# Patient Record
Sex: FEMALE | Race: WHITE | NOT HISPANIC OR LATINO | Employment: FULL TIME | URBAN - METROPOLITAN AREA
[De-identification: names, ages, dates, MRNs, and addresses within clinical notes are randomized per-mention and may not be internally consistent; named-entity substitution may affect disease eponyms.]

---

## 2017-10-06 DIAGNOSIS — Z12.11 COLON CANCER SCREENING: ICD-10-CM

## 2017-10-26 DIAGNOSIS — Z00.00 WELL ADULT EXAM: Primary | ICD-10-CM

## 2017-10-26 DIAGNOSIS — E78.5 HYPERLIPIDEMIA, UNSPECIFIED HYPERLIPIDEMIA TYPE: ICD-10-CM

## 2017-10-26 DIAGNOSIS — I10 ESSENTIAL HYPERTENSION: ICD-10-CM

## 2017-10-26 DIAGNOSIS — E55.9 VITAMIN D DEFICIENCY DISEASE: ICD-10-CM

## 2017-10-26 DIAGNOSIS — E66.9 OBESITY, UNSPECIFIED CLASSIFICATION, UNSPECIFIED OBESITY TYPE, UNSPECIFIED WHETHER SERIOUS COMORBIDITY PRESENT: ICD-10-CM

## 2017-10-26 RX ORDER — SIMVASTATIN 20 MG/1
TABLET, FILM COATED ORAL
Qty: 30 TABLET | Refills: 0 | Status: SHIPPED | OUTPATIENT
Start: 2017-10-26 | End: 2017-11-17 | Stop reason: SDUPTHER

## 2017-10-26 RX ORDER — SERTRALINE HYDROCHLORIDE 100 MG/1
TABLET, FILM COATED ORAL
Qty: 30 TABLET | Refills: 0 | Status: SHIPPED | OUTPATIENT
Start: 2017-10-26 | End: 2017-11-17 | Stop reason: SDUPTHER

## 2017-10-26 RX ORDER — LISINOPRIL AND HYDROCHLOROTHIAZIDE 10; 12.5 MG/1; MG/1
TABLET ORAL
Qty: 30 TABLET | Refills: 0 | Status: SHIPPED | OUTPATIENT
Start: 2017-10-26 | End: 2017-11-17 | Stop reason: SDUPTHER

## 2017-10-26 NOTE — TELEPHONE ENCOUNTER
Labs linked to scheduled appt  Mammogram referral already in    Sent referral for mammogram to coordinator.    Spoke with pt & she verbalized understanding

## 2017-10-26 NOTE — TELEPHONE ENCOUNTER
----- Message from Mona Burks sent at 10/26/2017 11:45 AM CDT -----  Contact: Self [phone]  Doctor appointment and lab have been scheduled.  Please link lab orders to the lab appointment.  Date of doctor appointment: 11/17   Physical or EP:  physical  Date of lab appointment: 11/04   Comments: Pt requesting orders to schedule mammo

## 2017-10-26 NOTE — TELEPHONE ENCOUNTER
The patient was due for a physical exam last month.  Labs and urine have been ordered.  Please schedule an inform the patient.  Thank you.

## 2017-11-14 ENCOUNTER — HOSPITAL ENCOUNTER (OUTPATIENT)
Dept: RADIOLOGY | Facility: HOSPITAL | Age: 57
Discharge: HOME | End: 2017-11-14
Attending: FAMILY MEDICINE
Payer: COMMERCIAL

## 2017-11-14 VITALS — HEIGHT: 62 IN | BODY MASS INDEX: 39.56 KG/M2 | WEIGHT: 215 LBS

## 2017-11-14 DIAGNOSIS — Z12.31 VISIT FOR SCREENING MAMMOGRAM: ICD-10-CM

## 2017-11-14 PROCEDURE — 77067 SCR MAMMO BI INCL CAD: CPT | Mod: 26,,, | Performed by: RADIOLOGY

## 2017-11-14 PROCEDURE — 77067 SCR MAMMO BI INCL CAD: CPT | Mod: TC

## 2017-11-14 PROCEDURE — 77063 BREAST TOMOSYNTHESIS BI: CPT | Mod: 26,,, | Performed by: RADIOLOGY

## 2017-11-17 ENCOUNTER — OFFICE VISIT (OUTPATIENT)
Dept: INTERNAL MEDICINE | Facility: CLINIC | Age: 57
End: 2017-11-17
Payer: COMMERCIAL

## 2017-11-17 ENCOUNTER — DOCUMENTATION ONLY (OUTPATIENT)
Dept: INTERNAL MEDICINE | Facility: CLINIC | Age: 57
End: 2017-11-17

## 2017-11-17 VITALS
HEIGHT: 62 IN | BODY MASS INDEX: 39.6 KG/M2 | SYSTOLIC BLOOD PRESSURE: 120 MMHG | TEMPERATURE: 99 F | RESPIRATION RATE: 18 BRPM | OXYGEN SATURATION: 95 % | WEIGHT: 215.19 LBS | HEART RATE: 83 BPM | DIASTOLIC BLOOD PRESSURE: 70 MMHG

## 2017-11-17 DIAGNOSIS — E66.9 OBESITY, UNSPECIFIED CLASSIFICATION, UNSPECIFIED OBESITY TYPE, UNSPECIFIED WHETHER SERIOUS COMORBIDITY PRESENT: ICD-10-CM

## 2017-11-17 DIAGNOSIS — Z12.11 COLON CANCER SCREENING: ICD-10-CM

## 2017-11-17 DIAGNOSIS — F41.9 ANXIETY: ICD-10-CM

## 2017-11-17 DIAGNOSIS — Z23 NEED FOR PROPHYLACTIC VACCINATION AND INOCULATION AGAINST INFLUENZA: ICD-10-CM

## 2017-11-17 DIAGNOSIS — L40.9 PSORIASIS: ICD-10-CM

## 2017-11-17 DIAGNOSIS — E78.5 HYPERLIPIDEMIA, UNSPECIFIED HYPERLIPIDEMIA TYPE: ICD-10-CM

## 2017-11-17 DIAGNOSIS — I10 ESSENTIAL HYPERTENSION: ICD-10-CM

## 2017-11-17 DIAGNOSIS — Z00.00 WELL ADULT EXAM: Primary | ICD-10-CM

## 2017-11-17 DIAGNOSIS — E55.9 VITAMIN D DEFICIENCY DISEASE: ICD-10-CM

## 2017-11-17 PROCEDURE — 90686 IIV4 VACC NO PRSV 0.5 ML IM: CPT | Mod: S$GLB,,, | Performed by: FAMILY MEDICINE

## 2017-11-17 PROCEDURE — 90471 IMMUNIZATION ADMIN: CPT | Mod: S$GLB,,, | Performed by: FAMILY MEDICINE

## 2017-11-17 PROCEDURE — 99396 PREV VISIT EST AGE 40-64: CPT | Mod: 25,S$GLB,, | Performed by: FAMILY MEDICINE

## 2017-11-17 PROCEDURE — 99999 PR PBB SHADOW E&M-EST. PATIENT-LVL IV: CPT | Mod: PBBFAC,,, | Performed by: FAMILY MEDICINE

## 2017-11-17 RX ORDER — SIMVASTATIN 20 MG/1
20 TABLET, FILM COATED ORAL NIGHTLY
Qty: 30 TABLET | Refills: 11 | Status: SHIPPED | OUTPATIENT
Start: 2017-11-17 | End: 2018-11-12 | Stop reason: SDUPTHER

## 2017-11-17 RX ORDER — SERTRALINE HYDROCHLORIDE 100 MG/1
100 TABLET, FILM COATED ORAL DAILY
Qty: 30 TABLET | Refills: 11 | Status: SHIPPED | OUTPATIENT
Start: 2017-11-17 | End: 2018-11-12 | Stop reason: SDUPTHER

## 2017-11-17 RX ORDER — LISINOPRIL AND HYDROCHLOROTHIAZIDE 10; 12.5 MG/1; MG/1
1 TABLET ORAL DAILY
Qty: 30 TABLET | Refills: 11 | Status: SHIPPED | OUTPATIENT
Start: 2017-11-17 | End: 2018-11-12 | Stop reason: SDUPTHER

## 2017-11-17 NOTE — PROGRESS NOTES
Subjective:       Patient ID: Claudia Álvarez is a 57 y.o. female.    Chief Complaint: Annual Exam    HPI 57-year-old white female presents to clinic today for annual physical exam.  She continues to have well-controlled hypertension on lisinopril-hydrochlorothiazide 10/12.5 mg daily.  Anxiety remains stable on Zoloft 100 mg daily.  Hyperlipidemia is well-controlled on simvastatin 20 mg daily.  She continues to be followed by dermatology for continued treatment of psoriasis.  She has a past surgical history of cholecystectomy, bilateral tubal ligation, and right knee arthroscopic surgery.  She has a family history of early coronary artery disease in her father who had a heart attack at the age of 65.  He also suffered from renal failure.  Flu vaccine has been discussed and will be given.  Patient declines Pap smear.  Patient declines colonoscopy and is willing to do colon cancer screening via FIT test.   Review of Systems   Constitutional: Negative for appetite change, chills, fatigue and fever.   HENT: Negative for congestion, ear pain, hearing loss, postnasal drip, rhinorrhea, sinus pressure, sore throat and tinnitus.    Eyes: Negative for redness, itching and visual disturbance.   Respiratory: Negative for cough, chest tightness and shortness of breath.    Cardiovascular: Negative for chest pain and palpitations.   Gastrointestinal: Negative for abdominal pain, constipation, diarrhea, nausea and vomiting.   Genitourinary: Negative for decreased urine volume, difficulty urinating, dysuria, frequency, hematuria and urgency.   Musculoskeletal: Negative for back pain, myalgias, neck pain and neck stiffness.   Skin: Negative for rash.   Neurological: Negative for dizziness, light-headedness and headaches.   Psychiatric/Behavioral: Negative.        Objective:      Physical Exam   Constitutional: She is oriented to person, place, and time. She appears well-developed and well-nourished. No distress.   HENT:   Head:  Normocephalic and atraumatic.   Right Ear: External ear normal.   Left Ear: External ear normal.   Nose: Nose normal.   Mouth/Throat: Oropharynx is clear and moist. No oropharyngeal exudate.   Eyes: Conjunctivae and EOM are normal. Pupils are equal, round, and reactive to light. Right eye exhibits no discharge. Left eye exhibits no discharge. No scleral icterus.   Neck: Normal range of motion. Neck supple. No JVD present. No tracheal deviation present. No thyromegaly present.   Cardiovascular: Normal rate, regular rhythm, normal heart sounds and intact distal pulses.  Exam reveals no gallop and no friction rub.    No murmur heard.  Pulmonary/Chest: Effort normal and breath sounds normal. No stridor. No respiratory distress. She has no wheezes. She has no rales.   Abdominal: Soft. Bowel sounds are normal. She exhibits no distension and no mass. There is no tenderness. There is no rebound and no guarding.   Musculoskeletal: Normal range of motion. She exhibits no edema or tenderness.   Lymphadenopathy:     She has no cervical adenopathy.   Neurological: She is alert and oriented to person, place, and time.   Skin: Skin is warm and dry. No rash noted. She is not diaphoretic. No erythema. No pallor.   Psychiatric: She has a normal mood and affect. Her behavior is normal. Judgment and thought content normal.   Nursing note and vitals reviewed.      Assessment:       1. Well adult exam    2. Essential hypertension    3. Hyperlipidemia, unspecified hyperlipidemia type    4. Anxiety    5. Psoriasis    6. Vitamin D deficiency disease    7. Obesity, unspecified classification, unspecified obesity type, unspecified whether serious comorbidity present    8. Need for prophylactic vaccination and inoculation against influenza    9. Colon cancer screening        Plan:      1.  Labs have been reviewed and are overall within normal limits.  2.  Continue lisinopril have hydrochlorothiazide 10/12.5 mg daily.  Hypertension is well  controlled.  3.  Continue simvastatin 20 mg daily.  Hyperlipidemia is well controlled.  4.  Continue Zoloft 100 mg daily.  Anxiety is stable.  5.  Continue follow-up with dermatology as scheduled.  Psoriasis is stable.  6.  Continue vitamin D 2000 units daily.  Vitamin D level is stable.  7.  Encourage diet and exercise.  8.  Flu vaccine given.  9.  FIT test.   10.  Return to clinic as needed or in one year for annual exam.

## 2017-11-24 ENCOUNTER — OFFICE VISIT (OUTPATIENT)
Dept: INTERNAL MEDICINE | Facility: CLINIC | Age: 57
End: 2017-11-24
Payer: COMMERCIAL

## 2017-11-24 VITALS
HEART RATE: 65 BPM | WEIGHT: 213.19 LBS | HEIGHT: 62 IN | SYSTOLIC BLOOD PRESSURE: 120 MMHG | OXYGEN SATURATION: 97 % | BODY MASS INDEX: 39.23 KG/M2 | DIASTOLIC BLOOD PRESSURE: 72 MMHG

## 2017-11-24 DIAGNOSIS — H10.9 CONJUNCTIVITIS OF LEFT EYE, UNSPECIFIED CONJUNCTIVITIS TYPE: ICD-10-CM

## 2017-11-24 DIAGNOSIS — H57.89 EYE SWELLING, LEFT: Primary | ICD-10-CM

## 2017-11-24 PROCEDURE — 99213 OFFICE O/P EST LOW 20 MIN: CPT | Mod: S$GLB,,, | Performed by: FAMILY MEDICINE

## 2017-11-24 PROCEDURE — 99999 PR PBB SHADOW E&M-EST. PATIENT-LVL III: CPT | Mod: PBBFAC,,, | Performed by: FAMILY MEDICINE

## 2017-11-24 RX ORDER — SULFACETAMIDE SODIUM 100 MG/ML
1 SOLUTION/ DROPS OPHTHALMIC
Qty: 5 ML | Refills: 0 | Status: SHIPPED | OUTPATIENT
Start: 2017-11-24 | End: 2017-11-29

## 2017-11-24 NOTE — PROGRESS NOTES
"S/  Pt was weeding in her garden last weekend and then after that started ot have left swollen eye and redness, no visual disturbance, no feeling for foreign body.   This has now been going on for 6 days and getting a pus like crusting  At the eye in the mornings    O/  /72   Pulse 65   Ht 5' 2" (1.575 m)   Wt 96.7 kg (213 lb 3 oz)   SpO2 97%   BMI 38.99 kg/m²   Left eye with sclera injected, good EOM rom without pain or diffiulty    Claudia was seen today for conjunctivitis.    Diagnoses and all orders for this visit:    Eye swelling, left  -     Ambulatory consult to Optometry    Conjunctivitis of left eye, unspecified conjunctivitis type  -     Ambulatory consult to Optometry  -     sulfacetamide sodium 10% (BLEPH-10) 10 % ophthalmic solution; Place 1 drop into the left eye every 2 (two) hours.        "

## 2017-12-26 ENCOUNTER — LAB VISIT (OUTPATIENT)
Dept: LAB | Facility: HOSPITAL | Age: 57
End: 2017-12-26
Attending: FAMILY MEDICINE
Payer: COMMERCIAL

## 2017-12-26 DIAGNOSIS — Z12.11 COLON CANCER SCREENING: ICD-10-CM

## 2017-12-26 PROCEDURE — 82274 ASSAY TEST FOR BLOOD FECAL: CPT

## 2017-12-27 LAB — HEMOCCULT STL QL IA: NEGATIVE

## 2018-10-17 ENCOUNTER — HOSPITAL ENCOUNTER (EMERGENCY)
Dept: HOSPITAL 35 - ER | Age: 58
Discharge: HOME | End: 2018-10-17
Payer: COMMERCIAL

## 2018-10-17 VITALS — HEIGHT: 62 IN | WEIGHT: 208.01 LBS | BODY MASS INDEX: 38.28 KG/M2

## 2018-10-17 VITALS — SYSTOLIC BLOOD PRESSURE: 132 MMHG | DIASTOLIC BLOOD PRESSURE: 64 MMHG

## 2018-10-17 DIAGNOSIS — E78.5: ICD-10-CM

## 2018-10-17 DIAGNOSIS — L02.811: Primary | ICD-10-CM

## 2018-10-17 DIAGNOSIS — L03.811: ICD-10-CM

## 2018-10-17 DIAGNOSIS — E78.00: ICD-10-CM

## 2018-10-17 DIAGNOSIS — I10: ICD-10-CM

## 2018-10-17 DIAGNOSIS — Z90.49: ICD-10-CM

## 2018-10-19 ENCOUNTER — OFFICE VISIT (OUTPATIENT)
Dept: SURGERY | Facility: CLINIC | Age: 58
End: 2018-10-19
Payer: COMMERCIAL

## 2018-10-19 ENCOUNTER — OFFICE VISIT (OUTPATIENT)
Dept: INTERNAL MEDICINE | Facility: CLINIC | Age: 58
End: 2018-10-19
Payer: COMMERCIAL

## 2018-10-19 VITALS
DIASTOLIC BLOOD PRESSURE: 73 MMHG | TEMPERATURE: 99 F | HEIGHT: 62 IN | HEART RATE: 72 BPM | WEIGHT: 205.13 LBS | SYSTOLIC BLOOD PRESSURE: 127 MMHG | BODY MASS INDEX: 37.75 KG/M2

## 2018-10-19 VITALS
HEIGHT: 62 IN | TEMPERATURE: 98 F | SYSTOLIC BLOOD PRESSURE: 117 MMHG | HEART RATE: 76 BPM | WEIGHT: 207.69 LBS | DIASTOLIC BLOOD PRESSURE: 68 MMHG | RESPIRATION RATE: 16 BRPM | BODY MASS INDEX: 38.22 KG/M2

## 2018-10-19 DIAGNOSIS — L02.811 ABSCESS OF SCALP: Primary | ICD-10-CM

## 2018-10-19 DIAGNOSIS — L02.811 ABSCESS OF HEAD: Primary | ICD-10-CM

## 2018-10-19 PROCEDURE — 99999 PR PBB SHADOW E&M-EST. PATIENT-LVL IV: CPT | Mod: PBBFAC,,, | Performed by: FAMILY MEDICINE

## 2018-10-19 PROCEDURE — 99213 OFFICE O/P EST LOW 20 MIN: CPT | Mod: S$GLB,,, | Performed by: FAMILY MEDICINE

## 2018-10-19 PROCEDURE — 99999 PR PBB SHADOW E&M-EST. PATIENT-LVL III: CPT | Mod: PBBFAC,,, | Performed by: STUDENT IN AN ORGANIZED HEALTH CARE EDUCATION/TRAINING PROGRAM

## 2018-10-19 PROCEDURE — 10060 I&D ABSCESS SIMPLE/SINGLE: CPT | Mod: S$GLB,,, | Performed by: STUDENT IN AN ORGANIZED HEALTH CARE EDUCATION/TRAINING PROGRAM

## 2018-10-19 PROCEDURE — 99204 OFFICE O/P NEW MOD 45 MIN: CPT | Mod: 25,S$GLB,, | Performed by: STUDENT IN AN ORGANIZED HEALTH CARE EDUCATION/TRAINING PROGRAM

## 2018-10-19 RX ORDER — TRAMADOL HYDROCHLORIDE 50 MG/1
50 TABLET ORAL EVERY 4 HOURS PRN
COMMUNITY
End: 2019-10-10

## 2018-10-19 RX ORDER — CLINDAMYCIN HYDROCHLORIDE 300 MG/1
300 CAPSULE ORAL EVERY 8 HOURS
COMMUNITY
Start: 2018-10-17 | End: 2018-10-29 | Stop reason: ALTCHOICE

## 2018-10-19 RX ORDER — MUPIROCIN 20 MG/G
OINTMENT TOPICAL 2 TIMES DAILY
Qty: 30 G | Refills: 0 | Status: SHIPPED | OUTPATIENT
Start: 2018-10-19 | End: 2021-11-22 | Stop reason: ALTCHOICE

## 2018-10-19 NOTE — PROGRESS NOTES
Patient ID: Claudia Álvarez is a 58 y.o. female.    Chief Complaint: No chief complaint on file.      HPI:  58F with posterior scalp pain, swelling for over a week. Got suddenly worse 2 days ago while she was in [place] for work. She went to the ER and I&D was performed. She was given clindamycin. She said they didn't get much drainage and it hasnt gotten any better after I&D and clinda. No fevers. Painful to turn her head. No purulent drainage, just some blood after I&D.  Never had this before.         Review of Systems   Constitutional: Negative for fever.   HENT: Negative for trouble swallowing.    Respiratory: Negative for shortness of breath.    Cardiovascular: Negative for chest pain.   Gastrointestinal: Negative for abdominal pain, blood in stool, nausea and vomiting.   Genitourinary: Negative for dysuria.   Musculoskeletal: Negative for gait problem.   Skin: Positive for wound. Negative for rash.   Allergic/Immunologic: Negative for immunocompromised state.   Neurological: Negative for weakness.   Hematological: Does not bruise/bleed easily.   Psychiatric/Behavioral: Negative for agitation.       Current Outpatient Medications   Medication Sig Dispense Refill    cholecalciferol, vitamin D3, 1,000 unit capsule Take 1,000 Units by mouth once daily.      clindamycin (CLEOCIN) 300 MG capsule Take 300 mg by mouth every 8 (eight) hours.      fluocinonide 0.05% (LIDEX) 0.05 % cream Apply 1 application topically 2 (two) times daily as needed.      lisinopril-hydrochlorothiazide (PRINZIDE,ZESTORETIC) 10-12.5 mg per tablet Take 1 tablet by mouth once daily. 30 tablet 11    multivitamin (THERAGRAN) per tablet Take 1 tablet by mouth once daily.      mupirocin (BACTROBAN) 2 % ointment Apply topically 2 (two) times daily. 30 g 0    sertraline (ZOLOFT) 100 MG tablet Take 1 tablet (100 mg total) by mouth once daily. 30 tablet 11    simvastatin (ZOCOR) 20 MG tablet Take 1 tablet (20 mg total) by mouth every  evening. 30 tablet 11    traMADol (ULTRAM) 50 mg tablet Take 50 mg by mouth every 4 (four) hours as needed for Pain.      triamcinolone acetonide 0.1% (KENALOG) 0.1 % cream Apply 1 application topically nightly as needed.       Current Facility-Administered Medications   Medication Dose Route Frequency Provider Last Rate Last Dose    triamcinolone acetonide injection 40 mg  40 mg Intramuscular 1 time in Clinic/HOD Malinda Ramirez NP           Review of patient's allergies indicates:  No Known Allergies    Past Medical History:   Diagnosis Date    Anxiety     Depression     Hypertension     Psoriasis        Past Surgical History:   Procedure Laterality Date    CHOLECYSTECTOMY      KNEE SURGERY Right 2004    Dr. [facility]/arthroscopy    TUBAL LIGATION         Family History   Problem Relation Age of Onset    Hypertension Mother     Hypertension Father     Heart disease Father 65        MI    Kidney disease Father 60    Melanoma Daughter        Social History     Socioeconomic History    Marital status:      Spouse name: Not on file    Number of children: Not on file    Years of education: Not on file    Highest education level: Not on file   Social Needs    Financial resource strain: Not on file    Food insecurity - worry: Not on file    Food insecurity - inability: Not on file    Transportation needs - medical: Not on file    Transportation needs - non-medical: Not on file   Occupational History     Employer: [email or web address]   Tobacco Use    Smoking status: Never Smoker    Smokeless tobacco: Never Used   Substance and Sexual Activity    Alcohol use: Yes     Comment: rare    Drug use: No    Sexual activity: Yes     Partners: Male   Other Topics Concern    Not on file   Social History Narrative    Not on file       Vitals:    10/19/18 1449   BP: 127/73   Pulse: 72   Temp: 99.2 °F (37.3 °C)       Physical Exam   Constitutional: She is oriented to person, place, and time. She appears  well-developed and well-nourished. No distress.   HENT:   Head: Normocephalic and atraumatic.       Eyes: No scleral icterus.   Cardiovascular: Normal rate.   Pulmonary/Chest: Effort normal. No stridor.   Abdominal: Soft. She exhibits no distension. There is no tenderness.   Lymphadenopathy:     She has no cervical adenopathy.   Neurological: She is alert and oriented to person, place, and time.   Skin: Skin is warm. No erythema.   Psychiatric: She has a normal mood and affect. Her behavior is normal.       Assessment & Plan:   58F with posterior scalp abscess  Continue clinda  Will I&D in the office  RTC monday

## 2018-10-19 NOTE — LETTER
October 19, 2018      Maynor Ayoub MD  [address] [place] [address]           [facility]  [address]  [address]th [address] [facility] [address]  Phone: [phone]          Patient: Claudia Álvarez   MR Number: [id number]   YOB: 1960   Date of Visit: 10/19/2018       Dear Dr. Maynor Ayoub:    Thank you for referring Claudia Álvarez to me for evaluation. Attached you will find relevant portions of my assessment and plan of care.    If you have questions, please do not hesitate to call me. I look forward to following Claudia Álvarez along with you.    Sincerely,    Martín Lozano MD    Enclosure  CC:  No Recipients    If you would like to receive this communication electronically, please contact [email or web address] or [phone] to request more information on [employer] Link access.    For providers and/or their staff who would like to refer a patient to [facility], please contact us through our one-stop-shop provider referral line, [facility] , at [phone].    If you feel you have received this communication in error or would no longer like to receive these types of communications, please e-mail [email or web address]

## 2018-10-19 NOTE — PROGRESS NOTES
"Claudia Álvarez is a 58 y.o. female patient.    Temp: 99.2 °F (37.3 °C) (10/19/18 1449)  Pulse: 72 (10/19/18 1449)  BP: 127/73 (10/19/18 1449)  Weight: 93.1 kg (205 lb 2.2 oz) (10/19/18 1449)  Height: 5' 2" (157.5 cm) (10/19/18 1449)       Incision and Drainage  Date/Time: 10/19/2018 4:32 PM  Performed by: Martín Lozano MD  Authorized by: Martín Lozano MD     Time out: Immediately prior to procedure a "time out" was called to verify the correct patient, procedure, equipment, support staff and site/side marked as required.    Consent Done?:  Yes (Written)    Type:  Abscess  Body area:  Head/neck  Location details:  Scalp  Anesthesia:  Local infiltration  Local anesthetic:  Lidocaine 1% without epinephrinelidocaine 1% with epinephrine  Scalpel size:  11  Incision type:  Single straight  Complexity:  Simple  Drainage:  Pus  Drainage amount:  Moderate  Wound treatment:  Incision and wound left open  Packing material:  1/4 in iodoform gauze  Patient tolerance:  Patient tolerated the procedure well with no immediate complications        Martín Lozano  10/19/2018    "

## 2018-10-19 NOTE — PROGRESS NOTES
Subjective:       Patient ID: Claudia Álvarez is a 58 y.o. female.    Chief Complaint: Follow-up    HPI 58-year-old white female presents to clinic today for emergency room follow-up secondary to an abscess to the posterior neck.  She presented to the emergency room in [place] while there and had incision and drainage of an abscess to the posterior neck.  She was placed on clindamycin and given tramadol for pain. She reports continued swelling and pain to the posterior neck but denies any continued drainage or bleeding from the abscess.  She denies any fever, chills, or body aches.  Review of Systems   Constitutional: Negative for appetite change, chills, fatigue and fever.   HENT: Negative for congestion, ear pain, hearing loss, postnasal drip, rhinorrhea, sinus pressure, sore throat and tinnitus.    Eyes: Negative for redness, itching and visual disturbance.   Respiratory: Negative for cough, chest tightness and shortness of breath.    Cardiovascular: Negative for chest pain and palpitations.   Gastrointestinal: Negative for abdominal pain, constipation, diarrhea, nausea and vomiting.   Genitourinary: Negative for decreased urine volume, difficulty urinating, dysuria, frequency, hematuria and urgency.   Musculoskeletal: Negative for back pain, myalgias, neck pain and neck stiffness.   Skin: Positive for wound (Abscess to posterior neck). Negative for rash.   Neurological: Positive for headaches. Negative for dizziness and light-headedness.   Psychiatric/Behavioral: Negative.        Objective:      Physical Exam   Constitutional: She is oriented to person, place, and time. She appears well-developed and well-nourished. No distress.   HENT:   Head: Normocephalic and atraumatic.   Right Ear: External ear normal.   Left Ear: External ear normal.   Nose: Nose normal.   Mouth/Throat: Oropharynx is clear and moist. No oropharyngeal exudate.   Eyes: Conjunctivae and EOM are normal. Pupils are equal, round, and  reactive to light. Right eye exhibits no discharge. Left eye exhibits no discharge. No scleral icterus.   Neck: Normal range of motion. Neck supple. No JVD present. No tracheal deviation present. No thyromegaly present.       Musculoskeletal: Normal range of motion. She exhibits no edema or tenderness.   Lymphadenopathy:     She has no cervical adenopathy.   Neurological: She is alert and oriented to person, place, and time.   Skin: Skin is warm and dry. No rash noted. She is not diaphoretic. No erythema. No pallor.   Psychiatric: She has a normal mood and affect. Her behavior is normal. Judgment and thought content normal.   Nursing note and vitals reviewed.      Assessment:       1. Abscess of head        Plan:       1.  Continue clindamycin as prescribed.  2.  Bactroban ointment 1 application to the affected area 2 times per day.  3.  Refer to General surgery for further evaluation and treatment of abscess.  4.  Return to clinic as needed if symptoms persist or worsen.

## 2018-10-22 ENCOUNTER — OFFICE VISIT (OUTPATIENT)
Dept: SURGERY | Facility: CLINIC | Age: 58
End: 2018-10-22
Payer: COMMERCIAL

## 2018-10-22 VITALS
WEIGHT: 205 LBS | HEIGHT: 62 IN | SYSTOLIC BLOOD PRESSURE: 113 MMHG | TEMPERATURE: 97 F | BODY MASS INDEX: 37.73 KG/M2 | DIASTOLIC BLOOD PRESSURE: 58 MMHG | HEART RATE: 61 BPM

## 2018-10-22 DIAGNOSIS — L02.811 ABSCESS OF SCALP: Primary | ICD-10-CM

## 2018-10-22 PROCEDURE — 99024 POSTOP FOLLOW-UP VISIT: CPT | Mod: S$GLB,,, | Performed by: STUDENT IN AN ORGANIZED HEALTH CARE EDUCATION/TRAINING PROGRAM

## 2018-10-22 PROCEDURE — 99999 PR PBB SHADOW E&M-EST. PATIENT-LVL III: CPT | Mod: PBBFAC,,, | Performed by: STUDENT IN AN ORGANIZED HEALTH CARE EDUCATION/TRAINING PROGRAM

## 2018-10-22 NOTE — PROGRESS NOTES
Feels much much better  No drainage  Packing out for 2 days now  No fevers  Bascially no pain  Full normal ROM in neck  Some induration but better  No dc from incision  Not TTP  Has one more day abx    RTC 1 week

## 2018-10-29 ENCOUNTER — OFFICE VISIT (OUTPATIENT)
Dept: SURGERY | Facility: CLINIC | Age: 58
End: 2018-10-29
Payer: COMMERCIAL

## 2018-10-29 VITALS
DIASTOLIC BLOOD PRESSURE: 77 MMHG | HEART RATE: 68 BPM | SYSTOLIC BLOOD PRESSURE: 138 MMHG | TEMPERATURE: 98 F | WEIGHT: 207.88 LBS | HEIGHT: 62 IN | BODY MASS INDEX: 38.25 KG/M2

## 2018-10-29 DIAGNOSIS — L02.811 SCALP ABSCESS: Primary | ICD-10-CM

## 2018-10-29 PROCEDURE — 99999 PR PBB SHADOW E&M-EST. PATIENT-LVL III: CPT | Mod: PBBFAC,,, | Performed by: STUDENT IN AN ORGANIZED HEALTH CARE EDUCATION/TRAINING PROGRAM

## 2018-10-29 PROCEDURE — 99024 POSTOP FOLLOW-UP VISIT: CPT | Mod: S$GLB,,, | Performed by: STUDENT IN AN ORGANIZED HEALTH CARE EDUCATION/TRAINING PROGRAM

## 2018-10-29 NOTE — PROGRESS NOTES
Feeling better no pain at all now  No drainage for about a week  Hair loss over abscess itself 1.5 by 2cm  Finished abx last week    I&D site healing well  nontender  No erythema  No drainage    RTC prn  Keep clean  Hair should return in weeks/months

## 2018-11-12 RX ORDER — SIMVASTATIN 20 MG/1
TABLET, FILM COATED ORAL
Qty: 30 TABLET | Refills: 11 | Status: SHIPPED | OUTPATIENT
Start: 2018-11-12 | End: 2019-10-10 | Stop reason: SDUPTHER

## 2018-11-12 RX ORDER — LISINOPRIL AND HYDROCHLOROTHIAZIDE 10; 12.5 MG/1; MG/1
TABLET ORAL
Qty: 30 TABLET | Refills: 11 | Status: SHIPPED | OUTPATIENT
Start: 2018-11-12 | End: 2019-10-10 | Stop reason: SDUPTHER

## 2018-11-12 RX ORDER — SERTRALINE HYDROCHLORIDE 100 MG/1
TABLET, FILM COATED ORAL
Qty: 30 TABLET | Refills: 11 | Status: SHIPPED | OUTPATIENT
Start: 2018-11-12 | End: 2019-10-10 | Stop reason: SDUPTHER

## 2018-11-14 ENCOUNTER — TELEPHONE (OUTPATIENT)
Dept: INTERNAL MEDICINE | Facility: CLINIC | Age: 58
End: 2018-11-14

## 2018-11-14 DIAGNOSIS — Z12.31 SCREENING MAMMOGRAM, ENCOUNTER FOR: ICD-10-CM

## 2018-11-14 DIAGNOSIS — Z00.00 ANNUAL PHYSICAL EXAM: Primary | ICD-10-CM

## 2019-01-03 ENCOUNTER — TELEPHONE (OUTPATIENT)
Dept: INTERNAL MEDICINE | Facility: CLINIC | Age: 59
End: 2019-01-03

## 2019-06-14 ENCOUNTER — TELEPHONE (OUTPATIENT)
Dept: INTERNAL MEDICINE | Facility: CLINIC | Age: 59
End: 2019-06-14

## 2019-09-23 ENCOUNTER — TELEPHONE (OUTPATIENT)
Dept: FAMILY MEDICINE | Facility: CLINIC | Age: 59
End: 2019-09-23

## 2019-09-23 DIAGNOSIS — Z12.39 SCREENING FOR BREAST CANCER: Primary | ICD-10-CM

## 2019-09-23 NOTE — TELEPHONE ENCOUNTER
----- Message from Aisha Ledezma sent at 9/23/2019 12:47 PM CDT -----  Contact: self  Doctor appointment and lab have been scheduled.  Please link lab orders to the lab appointment.  Date of doctor appointment:  10/10  Date of lab appointment:  10/5  Physical or EP: Epp  Comments:       Caller is requesting to schedule their annual screening mammogram appointment. Order is not listed in Epic.  Please enter order and contact patient to schedule.  Where would they like the mammogram performed?:  Imaging   Would the patient rather receive a phone call back or a response via [facility]?:  Phone   Additional information:

## 2019-09-24 ENCOUNTER — TELEPHONE (OUTPATIENT)
Dept: FAMILY MEDICINE | Facility: CLINIC | Age: 59
End: 2019-09-24

## 2019-10-05 ENCOUNTER — HOSPITAL ENCOUNTER (OUTPATIENT)
Dept: RADIOLOGY | Facility: HOSPITAL | Age: 59
Discharge: HOME | End: 2019-10-05
Attending: NURSE PRACTITIONER
Payer: COMMERCIAL

## 2019-10-05 DIAGNOSIS — Z12.39 SCREENING FOR BREAST CANCER: ICD-10-CM

## 2019-10-05 PROCEDURE — 77063 BREAST TOMOSYNTHESIS BI: CPT | Mod: 26,,, | Performed by: RADIOLOGY

## 2019-10-05 PROCEDURE — 77067 SCR MAMMO BI INCL CAD: CPT | Mod: 26,,, | Performed by: RADIOLOGY

## 2019-10-05 PROCEDURE — 77067 MAMMO DIGITAL SCREENING BILAT WITH TOMOSYNTHESIS_CAD: ICD-10-PCS | Mod: 26,,, | Performed by: RADIOLOGY

## 2019-10-05 PROCEDURE — 77063 MAMMO DIGITAL SCREENING BILAT WITH TOMOSYNTHESIS_CAD: ICD-10-PCS | Mod: 26,,, | Performed by: RADIOLOGY

## 2019-10-05 PROCEDURE — 77067 SCR MAMMO BI INCL CAD: CPT | Mod: TC

## 2019-10-07 ENCOUNTER — TELEPHONE (OUTPATIENT)
Dept: INTERNAL MEDICINE | Facility: CLINIC | Age: 59
End: 2019-10-07

## 2019-10-07 NOTE — TELEPHONE ENCOUNTER
----- Message from Michelle Li PA-C sent at 10/7/2019  6:32 AM CDT -----  Noble Hood,    This patient is scheduled to see you in 3 days for an annual exam.    Here are her labs.  It looks like I ordered the labs prior to an appointment but she never showed for her appointment with me.    Best,  Michelle Li PA-C  [facility] [address] [address]  P: [phone]  F: [phone]    ----- Message -----  From: Rad Soft Lab Interface  Sent: 10/5/2019  10:01 AM CDT  To: Michelle Li PA-C

## 2019-10-09 ENCOUNTER — TELEPHONE (OUTPATIENT)
Dept: INTERNAL MEDICINE | Facility: CLINIC | Age: 59
End: 2019-10-09

## 2019-10-09 NOTE — TELEPHONE ENCOUNTER
Scattered areas of dense tissue seen in breasts, no suspicious masses, no evidence of malignancy noted. Recommend repeat in 1 year.

## 2019-10-10 ENCOUNTER — OFFICE VISIT (OUTPATIENT)
Dept: INTERNAL MEDICINE | Facility: CLINIC | Age: 59
End: 2019-10-10
Payer: COMMERCIAL

## 2019-10-10 VITALS
TEMPERATURE: 99 F | BODY MASS INDEX: 39.63 KG/M2 | HEART RATE: 83 BPM | DIASTOLIC BLOOD PRESSURE: 60 MMHG | SYSTOLIC BLOOD PRESSURE: 110 MMHG | HEIGHT: 62 IN | WEIGHT: 215.38 LBS

## 2019-10-10 DIAGNOSIS — E78.5 HYPERLIPIDEMIA, UNSPECIFIED HYPERLIPIDEMIA TYPE: Primary | ICD-10-CM

## 2019-10-10 DIAGNOSIS — E55.9 VITAMIN D DEFICIENCY DISEASE: ICD-10-CM

## 2019-10-10 DIAGNOSIS — L40.9 PSORIASIS: ICD-10-CM

## 2019-10-10 DIAGNOSIS — Z00.00 PREVENTATIVE HEALTH CARE: ICD-10-CM

## 2019-10-10 DIAGNOSIS — E66.9 OBESITY, UNSPECIFIED CLASSIFICATION, UNSPECIFIED OBESITY TYPE, UNSPECIFIED WHETHER SERIOUS COMORBIDITY PRESENT: ICD-10-CM

## 2019-10-10 DIAGNOSIS — I10 ESSENTIAL HYPERTENSION: ICD-10-CM

## 2019-10-10 DIAGNOSIS — F41.9 ANXIETY: ICD-10-CM

## 2019-10-10 PROCEDURE — 99396 PR PREVENTIVE VISIT,EST,40-64: ICD-10-PCS | Mod: S$GLB,,, | Performed by: NURSE PRACTITIONER

## 2019-10-10 PROCEDURE — 99396 PREV VISIT EST AGE 40-64: CPT | Mod: S$GLB,,, | Performed by: NURSE PRACTITIONER

## 2019-10-10 PROCEDURE — 99999 PR PBB SHADOW E&M-EST. PATIENT-LVL III: CPT | Mod: PBBFAC,,, | Performed by: NURSE PRACTITIONER

## 2019-10-10 PROCEDURE — 99999 PR PBB SHADOW E&M-EST. PATIENT-LVL III: ICD-10-PCS | Mod: PBBFAC,,, | Performed by: NURSE PRACTITIONER

## 2019-10-10 RX ORDER — LISINOPRIL AND HYDROCHLOROTHIAZIDE 10; 12.5 MG/1; MG/1
1 TABLET ORAL DAILY
Qty: 30 TABLET | Refills: 11 | Status: SHIPPED | OUTPATIENT
Start: 2019-10-10 | End: 2020-11-19 | Stop reason: SDUPTHER

## 2019-10-10 RX ORDER — SIMVASTATIN 20 MG/1
20 TABLET, FILM COATED ORAL NIGHTLY
Qty: 30 TABLET | Refills: 11 | Status: SHIPPED | OUTPATIENT
Start: 2019-10-10 | End: 2020-11-19 | Stop reason: SDUPTHER

## 2019-10-10 RX ORDER — SERTRALINE HYDROCHLORIDE 100 MG/1
100 TABLET, FILM COATED ORAL DAILY
Qty: 30 TABLET | Refills: 11 | Status: SHIPPED | OUTPATIENT
Start: 2019-10-10 | End: 2020-11-19

## 2019-10-10 NOTE — PROGRESS NOTES
[facility] Note    Chief Complaint      Chief Complaint   Patient presents with    Annual Exam     History of Present Illness      Claudia Álvarez is a 59 y.o. female patient of Dr. Penny with chronic conditions of anxiety, psoriasis, HTN, hyperlipidemia vit d def, obesity who is new to me and presents today for her annual exam. Pt feeling well, no complaints, denies sob or cp.   Labs reviewed with pt  mammo-UTD  Fit kit- due  Flu vaccine- UTD, done 10/8/19      Problem List Items Addressed This Visit        Psychiatric    Anxiety    Relevant Medications    simvastatin (ZOCOR) 20 MG tablet    sertraline (ZOLOFT) 100 MG tablet    lisinopril-hydrochlorothiazide (PRINZIDE,ZESTORETIC) 10-12.5 mg per tablet       Derm    Psoriasis       Cardiac/Vascular    HTN (hypertension)    Relevant Medications    simvastatin (ZOCOR) 20 MG tablet    sertraline (ZOLOFT) 100 MG tablet    lisinopril-hydrochlorothiazide (PRINZIDE,ZESTORETIC) 10-12.5 mg per tablet    Hyperlipidemia - Primary    Relevant Medications    simvastatin (ZOCOR) 20 MG tablet    sertraline (ZOLOFT) 100 MG tablet    lisinopril-hydrochlorothiazide (PRINZIDE,ZESTORETIC) 10-12.5 mg per tablet       Endocrine    Vitamin D deficiency disease    Obesity      Other Visit Diagnoses     Preventative health care              Health Maintenance   Topic Date Due    TETANUS VACCINE  08/28/1976    Fecal Occult Blood Test (FOBT)/FitKit  12/26/2018    Mammogram  10/05/2021    Lipid Panel  10/05/2024    Hepatitis C Screening  Completed    Pap Smear with HPV Cotest  Discontinued       Past Medical History:   Diagnosis Date    Anxiety     Depression     Hypertension     Psoriasis        Past Surgical History:   Procedure Laterality Date    CHOLECYSTECTOMY      KNEE SURGERY Right 2004    Dr. [facility]/arthroscopy    TUBAL LIGATION         family history includes Heart disease (age of onset: 65) in her father; Hypertension in her father and mother;  Kidney disease (age of onset: 60) in her father; Melanoma in her daughter.    Social History     Tobacco Use    Smoking status: Never Smoker    Smokeless tobacco: Never Used   Substance Use Topics    Alcohol use: Yes     Comment: rare    Drug use: No       Review of Systems   Constitutional: Negative for chills and fever.   HENT: Negative for congestion, sinus pain and sore throat.    Eyes: Negative for blurred vision.   Respiratory: Negative for cough, shortness of breath and wheezing.    Cardiovascular: Negative for chest pain, palpitations and leg swelling.   Gastrointestinal: Negative for abdominal pain, constipation, diarrhea, nausea and vomiting.   Genitourinary: Negative for dysuria.   Musculoskeletal: Negative for myalgias.   Skin: Negative for rash.   Neurological: Negative for dizziness, weakness and headaches.   Psychiatric/Behavioral: Negative for depression. The patient is not nervous/anxious.         Outpatient Encounter Medications as of 10/10/2019   Medication Sig Dispense Refill    cholecalciferol, vitamin D3, 1,000 unit capsule Take 1,000 Units by mouth once daily.      fluocinonide 0.05% (LIDEX) 0.05 % cream Apply 1 application topically 2 (two) times daily as needed.      lisinopril-hydrochlorothiazide (PRINZIDE,ZESTORETIC) 10-12.5 mg per tablet Take 1 tablet by mouth once daily. 30 tablet 11    multivitamin (THERAGRAN) per tablet Take 1 tablet by mouth once daily.      mupirocin (BACTROBAN) 2 % ointment Apply topically 2 (two) times daily. 30 g 0    sertraline (ZOLOFT) 100 MG tablet Take 1 tablet (100 mg total) by mouth once daily. 30 tablet 11    simvastatin (ZOCOR) 20 MG tablet Take 1 tablet (20 mg total) by mouth every evening. 30 tablet 11    [DISCONTINUED] lisinopril-hydrochlorothiazide (PRINZIDE,ZESTORETIC) 10-12.5 mg per tablet TAKE ONE TABLET BY MOUTH EVERY DAY 30 tablet 11    [DISCONTINUED] sertraline (ZOLOFT) 100 MG tablet TAKE ONE TABLET BY MOUTH EVERY DAY 30 tablet 11  "   [DISCONTINUED] simvastatin (ZOCOR) 20 MG tablet TAKE ONE TABLET BY MOUTH EVERY EVENING 30 tablet 11    [DISCONTINUED] traMADol (ULTRAM) 50 mg tablet Take 50 mg by mouth every 4 (four) hours as needed for Pain.      [DISCONTINUED] triamcinolone acetonide injection 40 mg        No facility-administered encounter medications on file as of 10/10/2019.         Review of patient's allergies indicates:  No Known Allergies    Physical Exam      Vital Signs  Temp: 99 °F (37.2 °C)  Temp src: Oral  Pulse: 83  BP: 110/60  BP Location: Left arm  Patient Position: Sitting  Pain Score: 0-No pain  Height and Weight  Height: 5' 2" (157.5 cm)  Weight: 97.7 kg (215 lb 6.2 oz)  BSA (Calculated - sq m): 2.07 sq meters  BMI (Calculated): 39.5  Weight in (lb) to have BMI = 25: 136.4]    Physical Exam   Constitutional: She is oriented to person, place, and time. She appears well-developed and well-nourished.   HENT:   Head: Normocephalic and atraumatic.   Right Ear: External ear normal.   Left Ear: External ear normal.   Mouth/Throat: Oropharynx is clear and moist.   Eyes: Pupils are equal, round, and reactive to light. Conjunctivae and EOM are normal.   Neck: Normal range of motion. Neck supple. No JVD present. No tracheal deviation present. No thyromegaly present.   Cardiovascular: Normal rate, regular rhythm, normal heart sounds and intact distal pulses.   No murmur heard.  Pulmonary/Chest: Effort normal and breath sounds normal. She exhibits no tenderness.   Abdominal: Soft. Bowel sounds are normal. There is no tenderness. There is no guarding.   Musculoskeletal: Normal range of motion.   Lymphadenopathy:     She has no cervical adenopathy.   Neurological: She is alert and oriented to person, place, and time.   Skin: Skin is warm and dry.   Psychiatric: She has a normal mood and affect. Her behavior is normal. Judgment and thought content normal.   Nursing note reviewed.       Laboratory:  CBC:  Recent Labs   Lab Result Units " 10/05/19  0938   WBC K/uL 5.65   RBC M/uL 4.80   Hemoglobin g/dL 13.7   Hematocrit % 40.7   Platelets K/uL 211   Mean Corpuscular Volume fL 85   Mean Corpuscular Hemoglobin pg 28.5   Mean Corpuscular Hemoglobin Conc g/dL 33.7     CMP:  Recent Labs   Lab Result Units 10/05/19  0938   Glucose mg/dL 118*   Calcium mg/dL 9.7   Albumin g/dL 4.5   Total Protein g/dL 8.1   Sodium mmol/L 140   Potassium mmol/L 4.6   CO2 mmol/L 27   Chloride mmol/L 103   BUN, Bld mg/dL 22   Alkaline Phosphatase U/L 66   ALT U/L 27   AST U/L 22   Total Bilirubin mg/dL 0.7     URINALYSIS:  No results for input(s): COLORU, CLARITYU, SPECGRAV, PHUR, PROTEINUA, GLUCOSEU, BILIRUBINCON, BLOODU, WBCU, RBCU, BACTERIA, MUCUS, NITRITE, LEUKOCYTESUR, UROBILINOGEN, HYALINECASTS in the last 2160 hours.   LIPIDS:  Recent Labs   Lab Result Units 10/05/19  0938   TSH uIU/mL 1.965   HDL mg/dL 47   Cholesterol mg/dL 196   Triglycerides mg/dL 92   LDL Cholesterol mg/dL 130.6   Hdl/Cholesterol Ratio % 24.0   Non-HDL Cholesterol mg/dL 149   Total Cholesterol/HDL Ratio  4.2     TSH:  Recent Labs   Lab Result Units 10/05/19  0938   TSH uIU/mL 1.965     A1C:  Recent Labs   Lab Result Units 10/05/19  0938   Hemoglobin A1C % 5.9*         Assessment/Plan     Claudia Álvarez is a 59 y.o.female with:    1. Preventative health care    2. Hyperlipidemia, unspecified hyperlipidemia type  - simvastatin (ZOCOR) 20 MG tablet; Take 1 tablet (20 mg total) by mouth every evening.  Dispense: 30 tablet; Refill: 11  - sertraline (ZOLOFT) 100 MG tablet; Take 1 tablet (100 mg total) by mouth once daily.  Dispense: 30 tablet; Refill: 11  - lisinopril-hydrochlorothiazide (PRINZIDE,ZESTORETIC) 10-12.5 mg per tablet; Take 1 tablet by mouth once daily.  Dispense: 30 tablet; Refill: 11    3. Anxiety  - simvastatin (ZOCOR) 20 MG tablet; Take 1 tablet (20 mg total) by mouth every evening.  Dispense: 30 tablet; Refill: 11  - sertraline (ZOLOFT) 100 MG tablet; Take 1 tablet (100 mg total) by  mouth once daily.  Dispense: 30 tablet; Refill: 11  - lisinopril-hydrochlorothiazide (PRINZIDE,ZESTORETIC) 10-12.5 mg per tablet; Take 1 tablet by mouth once daily.  Dispense: 30 tablet; Refill: 11    4. Essential hypertension  - simvastatin (ZOCOR) 20 MG tablet; Take 1 tablet (20 mg total) by mouth every evening.  Dispense: 30 tablet; Refill: 11  - sertraline (ZOLOFT) 100 MG tablet; Take 1 tablet (100 mg total) by mouth once daily.  Dispense: 30 tablet; Refill: 11  - lisinopril-hydrochlorothiazide (PRINZIDE,ZESTORETIC) 10-12.5 mg per tablet; Take 1 tablet by mouth once daily.  Dispense: 30 tablet; Refill: 11    5. Psoriasis    6. Obesity, unspecified classification, unspecified obesity type, unspecified whether serious comorbidity present    7. Vitamin D deficiency disease    Chronic conditions currently being monitored  -Continue current medications and maintain follow up with specialists.  Return to clinic in 6 months or sooner for any concerns.        Erin Jiminez, NP-C [facility]

## 2020-05-14 ENCOUNTER — HISTORICAL (OUTPATIENT)
Dept: LAB | Facility: HOSPITAL | Age: 60
End: 2020-05-14

## 2020-11-09 ENCOUNTER — TELEPHONE (OUTPATIENT)
Dept: INTERNAL MEDICINE | Facility: CLINIC | Age: 60
End: 2020-11-09

## 2020-11-09 DIAGNOSIS — E78.5 HYPERLIPIDEMIA, UNSPECIFIED HYPERLIPIDEMIA TYPE: ICD-10-CM

## 2020-11-09 DIAGNOSIS — E55.9 VITAMIN D DEFICIENCY DISEASE: ICD-10-CM

## 2020-11-09 DIAGNOSIS — Z00.00 WELL ADULT EXAM: Primary | ICD-10-CM

## 2020-11-09 DIAGNOSIS — Z12.31 SCREENING MAMMOGRAM, ENCOUNTER FOR: ICD-10-CM

## 2020-11-09 NOTE — TELEPHONE ENCOUNTER
Please order labs to be linked to annual physical appt  & mammogram too please    LMOR for pt re: no appts for Saturday lab.   Please call back to reschedule

## 2020-11-09 NOTE — TELEPHONE ENCOUNTER
----- Message from Arleth Garcia sent at 11/9/2020  3:45 PM CST -----  Contact: Patient [phone]  Patient scheduled her annual and wants to do her labs and mammo on this Saturday 07/14/2020.    Please place orders and call to schedule.    Please call and advise.    Thank You a

## 2020-11-14 ENCOUNTER — HOSPITAL ENCOUNTER (OUTPATIENT)
Dept: RADIOLOGY | Facility: HOSPITAL | Age: 60
Discharge: HOME | End: 2020-11-14
Attending: FAMILY MEDICINE
Payer: COMMERCIAL

## 2020-11-14 DIAGNOSIS — Z12.31 SCREENING MAMMOGRAM, ENCOUNTER FOR: ICD-10-CM

## 2020-11-14 PROCEDURE — 77063 BREAST TOMOSYNTHESIS BI: CPT | Mod: 26,,, | Performed by: RADIOLOGY

## 2020-11-14 PROCEDURE — 77067 SCR MAMMO BI INCL CAD: CPT | Mod: TC

## 2020-11-14 PROCEDURE — 77067 SCR MAMMO BI INCL CAD: CPT | Mod: 26,,, | Performed by: RADIOLOGY

## 2020-11-14 PROCEDURE — 77063 MAMMO DIGITAL SCREENING BILAT WITH TOMO: ICD-10-PCS | Mod: 26,,, | Performed by: RADIOLOGY

## 2020-11-14 PROCEDURE — 77067 MAMMO DIGITAL SCREENING BILAT WITH TOMO: ICD-10-PCS | Mod: 26,,, | Performed by: RADIOLOGY

## 2020-11-19 ENCOUNTER — OFFICE VISIT (OUTPATIENT)
Dept: INTERNAL MEDICINE | Facility: CLINIC | Age: 60
End: 2020-11-19
Payer: COMMERCIAL

## 2020-11-19 VITALS
BODY MASS INDEX: 40.45 KG/M2 | RESPIRATION RATE: 16 BRPM | WEIGHT: 219.81 LBS | SYSTOLIC BLOOD PRESSURE: 114 MMHG | HEIGHT: 62 IN | HEART RATE: 72 BPM | DIASTOLIC BLOOD PRESSURE: 68 MMHG | TEMPERATURE: 98 F | OXYGEN SATURATION: 97 %

## 2020-11-19 DIAGNOSIS — Z12.11 COLON CANCER SCREENING: ICD-10-CM

## 2020-11-19 DIAGNOSIS — Z00.00 WELL ADULT EXAM: Primary | ICD-10-CM

## 2020-11-19 DIAGNOSIS — E78.5 HYPERLIPIDEMIA, UNSPECIFIED HYPERLIPIDEMIA TYPE: ICD-10-CM

## 2020-11-19 DIAGNOSIS — I10 ESSENTIAL HYPERTENSION: ICD-10-CM

## 2020-11-19 DIAGNOSIS — Z23 NEED FOR PROPHYLACTIC VACCINATION AND INOCULATION AGAINST INFLUENZA: ICD-10-CM

## 2020-11-19 DIAGNOSIS — F41.9 ANXIETY: ICD-10-CM

## 2020-11-19 DIAGNOSIS — E66.01 CLASS 3 SEVERE OBESITY WITHOUT SERIOUS COMORBIDITY WITH BODY MASS INDEX (BMI) OF 40.0 TO 44.9 IN ADULT, UNSPECIFIED OBESITY TYPE: ICD-10-CM

## 2020-11-19 PROCEDURE — 99999 PR PBB SHADOW E&M-EST. PATIENT-LVL IV: ICD-10-PCS | Mod: PBBFAC,,, | Performed by: FAMILY MEDICINE

## 2020-11-19 PROCEDURE — 3074F PR MOST RECENT SYSTOLIC BLOOD PRESSURE < 130 MM HG: ICD-10-PCS | Mod: CPTII,S$GLB,, | Performed by: FAMILY MEDICINE

## 2020-11-19 PROCEDURE — 90686 FLU VACCINE (QUAD) GREATER THAN OR EQUAL TO 3YO PRESERVATIVE FREE IM: ICD-10-PCS | Mod: S$GLB,,, | Performed by: FAMILY MEDICINE

## 2020-11-19 PROCEDURE — 3008F PR BODY MASS INDEX (BMI) DOCUMENTED: ICD-10-PCS | Mod: CPTII,S$GLB,, | Performed by: FAMILY MEDICINE

## 2020-11-19 PROCEDURE — 3078F PR MOST RECENT DIASTOLIC BLOOD PRESSURE < 80 MM HG: ICD-10-PCS | Mod: CPTII,S$GLB,, | Performed by: FAMILY MEDICINE

## 2020-11-19 PROCEDURE — 1126F AMNT PAIN NOTED NONE PRSNT: CPT | Mod: S$GLB,,, | Performed by: FAMILY MEDICINE

## 2020-11-19 PROCEDURE — 99396 PREV VISIT EST AGE 40-64: CPT | Mod: 25,S$GLB,, | Performed by: FAMILY MEDICINE

## 2020-11-19 PROCEDURE — 90471 IMMUNIZATION ADMIN: CPT | Mod: S$GLB,,, | Performed by: FAMILY MEDICINE

## 2020-11-19 PROCEDURE — 90686 IIV4 VACC NO PRSV 0.5 ML IM: CPT | Mod: S$GLB,,, | Performed by: FAMILY MEDICINE

## 2020-11-19 PROCEDURE — 3074F SYST BP LT 130 MM HG: CPT | Mod: CPTII,S$GLB,, | Performed by: FAMILY MEDICINE

## 2020-11-19 PROCEDURE — 99999 PR PBB SHADOW E&M-EST. PATIENT-LVL IV: CPT | Mod: PBBFAC,,, | Performed by: FAMILY MEDICINE

## 2020-11-19 PROCEDURE — 1126F PR PAIN SEVERITY QUANTIFIED, NO PAIN PRESENT: ICD-10-PCS | Mod: S$GLB,,, | Performed by: FAMILY MEDICINE

## 2020-11-19 PROCEDURE — 90471 FLU VACCINE (QUAD) GREATER THAN OR EQUAL TO 3YO PRESERVATIVE FREE IM: ICD-10-PCS | Mod: S$GLB,,, | Performed by: FAMILY MEDICINE

## 2020-11-19 PROCEDURE — 99396 PR PREVENTIVE VISIT,EST,40-64: ICD-10-PCS | Mod: 25,S$GLB,, | Performed by: FAMILY MEDICINE

## 2020-11-19 PROCEDURE — 3008F BODY MASS INDEX DOCD: CPT | Mod: CPTII,S$GLB,, | Performed by: FAMILY MEDICINE

## 2020-11-19 PROCEDURE — 3078F DIAST BP <80 MM HG: CPT | Mod: CPTII,S$GLB,, | Performed by: FAMILY MEDICINE

## 2020-11-19 RX ORDER — SIMVASTATIN 20 MG/1
20 TABLET, FILM COATED ORAL NIGHTLY
Qty: 30 TABLET | Refills: 11 | Status: SHIPPED | OUTPATIENT
Start: 2020-11-19 | End: 2021-11-22 | Stop reason: SDUPTHER

## 2020-11-19 RX ORDER — SERTRALINE HYDROCHLORIDE 100 MG/1
200 TABLET, FILM COATED ORAL DAILY
Qty: 60 TABLET | Refills: 11 | Status: SHIPPED | OUTPATIENT
Start: 2020-11-19 | End: 2021-11-22 | Stop reason: SDUPTHER

## 2020-11-19 RX ORDER — LISINOPRIL AND HYDROCHLOROTHIAZIDE 10; 12.5 MG/1; MG/1
1 TABLET ORAL DAILY
Qty: 30 TABLET | Refills: 11 | Status: SHIPPED | OUTPATIENT
Start: 2020-11-19 | End: 2021-11-22 | Stop reason: SDUPTHER

## 2020-11-19 NOTE — PROGRESS NOTES
Subjective:       Patient ID: Claudia Álvarez is a 60 y.o. female.    Chief Complaint: Annual Exam    HPI 60-year-old white female presents to clinic today for annual physical exam.  Hypertension remains well controlled on lisinopril-hydrochlorothiazide 10/12.5 mg daily.  Hyperlipidemia is well controlled on simvastatin 20 mg daily.  She continues to take Zoloft 100 mg daily for anxiety but recently has noted increased anxiety and irritability.  I have recommended increasing Zoloft to 200 mg daily for further treatment.  She has a past surgical history of cholecystectomy, bilateral tubal ligation, and right knee arthroscopic surgery.  She has a family history of early coronary artery disease in her father who had heart attack at the age of 65.  Her mother has hypertension and diabetes.  Flu vaccine has been given today.  Colon cancer screening will be performed via Cologuard.  Review of Systems   Constitutional: Negative for appetite change, chills, fatigue and fever.   HENT: Negative for nasal congestion, ear pain, hearing loss, postnasal drip, rhinorrhea, sinus pressure/congestion, sore throat and tinnitus.    Eyes: Negative for redness, itching and visual disturbance.   Respiratory: Negative for cough, chest tightness and shortness of breath.    Cardiovascular: Negative for chest pain and palpitations.   Gastrointestinal: Negative for abdominal pain, constipation, diarrhea, nausea and vomiting.   Genitourinary: Negative for decreased urine volume, difficulty urinating, dysuria, frequency, hematuria and urgency.   Musculoskeletal: Negative for back pain, myalgias, neck pain and neck stiffness.   Integumentary:  Negative for rash.   Neurological: Negative for dizziness, light-headedness and headaches.   Psychiatric/Behavioral: Positive for sleep disturbance. The patient is nervous/anxious.          Objective:      Physical Exam  Vitals signs and nursing note reviewed.   Constitutional:       General: She is not in  acute distress.     Appearance: She is well-developed. She is not diaphoretic.   HENT:      Head: Normocephalic and atraumatic.      Right Ear: External ear normal.      Left Ear: External ear normal.      Nose: Nose normal.      Mouth/Throat:      Pharynx: No oropharyngeal exudate.   Eyes:      General: No scleral icterus.        Right eye: No discharge.         Left eye: No discharge.      Conjunctiva/sclera: Conjunctivae normal.      Pupils: Pupils are equal, round, and reactive to light.   Neck:      Musculoskeletal: Normal range of motion and neck supple.      Thyroid: No thyromegaly.      Vascular: No JVD.      Trachea: No tracheal deviation.   Cardiovascular:      Rate and Rhythm: Normal rate and regular rhythm.      Heart sounds: Normal heart sounds. No murmur. No friction rub. No gallop.    Pulmonary:      Effort: Pulmonary effort is normal. No respiratory distress.      Breath sounds: Normal breath sounds. No stridor. No wheezing or rales.   Abdominal:      General: Bowel sounds are normal. There is no distension.      Palpations: Abdomen is soft. There is no mass.      Tenderness: There is no abdominal tenderness. There is no guarding or rebound.   Musculoskeletal: Normal range of motion.         General: No tenderness.   Lymphadenopathy:      Cervical: No cervical adenopathy.   Skin:     General: Skin is warm and dry.      Coloration: Skin is not pale.      Findings: No erythema or rash.   Neurological:      Mental Status: She is alert and oriented to person, place, and time.   Psychiatric:         Behavior: Behavior normal.         Thought Content: Thought content normal.         Judgment: Judgment normal.         Assessment:       1. Well adult exam    2. Essential hypertension    3. Hyperlipidemia, unspecified hyperlipidemia type    4. Anxiety    5. Class 3 severe obesity without serious comorbidity with body mass index (BMI) of 40.0 to 44.9 in adult, unspecified obesity type    6. Colon cancer  screening    7. Need for prophylactic vaccination and inoculation against influenza        Plan:       1.  Labs have been reviewed and are overall within normal limits.  2.  Continue lisinopril-hydrochlorothiazide 10/12.5 mg daily.  Hypertension is well controlled.  3.  Continue simvastatin 20 mg daily.  Hyperlipidemia is well controlled.  4.  Increase Zoloft to 200 mg daily.  5.  Encourage diet and exercise.  6.  Cologuard for colon cancer screening.  7.  Flu shot given.  8.  Return to clinic as needed or in 1 year for annual exam.

## 2021-01-20 ENCOUNTER — TELEPHONE (OUTPATIENT)
Dept: INTERNAL MEDICINE | Facility: CLINIC | Age: 61
End: 2021-01-20

## 2021-10-20 ENCOUNTER — TELEPHONE (OUTPATIENT)
Dept: INTERNAL MEDICINE | Facility: CLINIC | Age: 61
End: 2021-10-20

## 2021-10-20 DIAGNOSIS — Z12.31 SCREENING MAMMOGRAM, ENCOUNTER FOR: Primary | ICD-10-CM

## 2021-10-21 LAB — NONINV COLON CA DNA+OCC BLD SCRN STL QL: NEGATIVE

## 2021-11-10 ENCOUNTER — PATIENT OUTREACH (OUTPATIENT)
Dept: ADMINISTRATIVE | Facility: HOSPITAL | Age: 61
End: 2021-11-10
Payer: COMMERCIAL

## 2021-11-13 ENCOUNTER — TELEPHONE (OUTPATIENT)
Dept: INTERNAL MEDICINE | Facility: CLINIC | Age: 61
End: 2021-11-13
Payer: COMMERCIAL

## 2021-11-13 ENCOUNTER — IMMUNIZATION (OUTPATIENT)
Dept: INTERNAL MEDICINE | Facility: CLINIC | Age: 61
End: 2021-11-13
Payer: COMMERCIAL

## 2021-11-13 DIAGNOSIS — Z23 NEED FOR VACCINATION: Primary | ICD-10-CM

## 2021-11-13 DIAGNOSIS — E78.5 HYPERLIPIDEMIA, UNSPECIFIED HYPERLIPIDEMIA TYPE: ICD-10-CM

## 2021-11-13 DIAGNOSIS — Z00.00 WELL ADULT EXAM: Primary | ICD-10-CM

## 2021-11-13 PROCEDURE — 90686 FLU VACCINE (QUAD) GREATER THAN OR EQUAL TO 3YO PRESERVATIVE FREE IM: ICD-10-PCS | Mod: S$GLB,,, | Performed by: INTERNAL MEDICINE

## 2021-11-13 PROCEDURE — 90471 IMMUNIZATION ADMIN: CPT | Mod: S$GLB,,, | Performed by: INTERNAL MEDICINE

## 2021-11-13 PROCEDURE — 90686 IIV4 VACC NO PRSV 0.5 ML IM: CPT | Mod: S$GLB,,, | Performed by: INTERNAL MEDICINE

## 2021-11-13 PROCEDURE — 0034A COVID-19,VECTOR-NR,RS-AD26,PF,0.5 ML DOSE VACCINE (JANSSEN): CPT | Mod: CV19,PBBFAC | Performed by: INTERNAL MEDICINE

## 2021-11-13 PROCEDURE — 90471 FLU VACCINE (QUAD) GREATER THAN OR EQUAL TO 3YO PRESERVATIVE FREE IM: ICD-10-PCS | Mod: S$GLB,,, | Performed by: INTERNAL MEDICINE

## 2021-11-13 PROCEDURE — 91303 COVID-19,VECTOR-NR,RS-AD26,PF,0.5 ML DOSE VACCINE (JANSSEN): CPT | Mod: PBBFAC | Performed by: INTERNAL MEDICINE

## 2021-11-15 ENCOUNTER — HOSPITAL ENCOUNTER (OUTPATIENT)
Dept: RADIOLOGY | Facility: HOSPITAL | Age: 61
Discharge: HOME | End: 2021-11-15
Attending: FAMILY MEDICINE
Payer: COMMERCIAL

## 2021-11-15 DIAGNOSIS — Z12.31 SCREENING MAMMOGRAM, ENCOUNTER FOR: ICD-10-CM

## 2021-11-15 PROCEDURE — 77067 MAMMO DIGITAL SCREENING BILAT WITH TOMO: ICD-10-PCS | Mod: 26,,, | Performed by: RADIOLOGY

## 2021-11-15 PROCEDURE — 77063 MAMMO DIGITAL SCREENING BILAT WITH TOMO: ICD-10-PCS | Mod: 26,,, | Performed by: RADIOLOGY

## 2021-11-15 PROCEDURE — 77067 SCR MAMMO BI INCL CAD: CPT | Mod: 26,,, | Performed by: RADIOLOGY

## 2021-11-15 PROCEDURE — 77063 BREAST TOMOSYNTHESIS BI: CPT | Mod: 26,,, | Performed by: RADIOLOGY

## 2021-11-15 PROCEDURE — 77067 SCR MAMMO BI INCL CAD: CPT | Mod: TC

## 2021-11-16 ENCOUNTER — TELEPHONE (OUTPATIENT)
Dept: INTERNAL MEDICINE | Facility: CLINIC | Age: 61
End: 2021-11-16
Payer: COMMERCIAL

## 2021-11-16 DIAGNOSIS — Z00.00 WELL ADULT EXAM: Primary | ICD-10-CM

## 2021-11-18 ENCOUNTER — LAB VISIT (OUTPATIENT)
Dept: LAB | Facility: HOSPITAL | Age: 61
End: 2021-11-18
Attending: FAMILY MEDICINE
Payer: COMMERCIAL

## 2021-11-18 ENCOUNTER — TELEPHONE (OUTPATIENT)
Dept: INTERNAL MEDICINE | Facility: CLINIC | Age: 61
End: 2021-11-18
Payer: COMMERCIAL

## 2021-11-18 DIAGNOSIS — Z00.00 WELL ADULT EXAM: ICD-10-CM

## 2021-11-18 DIAGNOSIS — Z00.00 WELL ADULT EXAM: Primary | ICD-10-CM

## 2021-11-18 LAB
BILIRUB UR QL STRIP: NEGATIVE
CLARITY UR REFRACT.AUTO: CLEAR
COLOR UR AUTO: YELLOW
GLUCOSE UR QL STRIP: NEGATIVE
HGB UR QL STRIP: NEGATIVE
KETONES UR QL STRIP: NEGATIVE
LEUKOCYTE ESTERASE UR QL STRIP: NEGATIVE
NITRITE UR QL STRIP: NEGATIVE
PH UR STRIP: 7 [PH] (ref 5–8)
PROT UR QL STRIP: NEGATIVE
SP GR UR STRIP: 1.01 (ref 1–1.03)
URN SPEC COLLECT METH UR: NORMAL

## 2021-11-18 PROCEDURE — 81003 URINALYSIS AUTO W/O SCOPE: CPT | Performed by: FAMILY MEDICINE

## 2021-11-22 ENCOUNTER — TELEPHONE (OUTPATIENT)
Dept: INTERNAL MEDICINE | Facility: CLINIC | Age: 61
End: 2021-11-22

## 2021-11-22 ENCOUNTER — OFFICE VISIT (OUTPATIENT)
Dept: INTERNAL MEDICINE | Facility: CLINIC | Age: 61
End: 2021-11-22
Payer: COMMERCIAL

## 2021-11-22 VITALS
HEART RATE: 66 BPM | OXYGEN SATURATION: 99 % | HEIGHT: 62 IN | SYSTOLIC BLOOD PRESSURE: 110 MMHG | WEIGHT: 201.5 LBS | BODY MASS INDEX: 37.08 KG/M2 | TEMPERATURE: 97 F | DIASTOLIC BLOOD PRESSURE: 70 MMHG

## 2021-11-22 DIAGNOSIS — I10 ESSENTIAL HYPERTENSION: ICD-10-CM

## 2021-11-22 DIAGNOSIS — Z00.00 ANNUAL PHYSICAL EXAM: Primary | ICD-10-CM

## 2021-11-22 DIAGNOSIS — E66.01 CLASS 3 SEVERE OBESITY WITHOUT SERIOUS COMORBIDITY WITH BODY MASS INDEX (BMI) OF 40.0 TO 44.9 IN ADULT, UNSPECIFIED OBESITY TYPE: ICD-10-CM

## 2021-11-22 DIAGNOSIS — I10 PRIMARY HYPERTENSION: ICD-10-CM

## 2021-11-22 DIAGNOSIS — F41.9 ANXIETY: ICD-10-CM

## 2021-11-22 DIAGNOSIS — E78.5 HYPERLIPIDEMIA, UNSPECIFIED HYPERLIPIDEMIA TYPE: ICD-10-CM

## 2021-11-22 PROCEDURE — 4010F PR ACE/ARB THEARPY RXD/TAKEN: ICD-10-PCS | Mod: CPTII,S$GLB,, | Performed by: NURSE PRACTITIONER

## 2021-11-22 PROCEDURE — 99999 PR PBB SHADOW E&M-EST. PATIENT-LVL III: CPT | Mod: PBBFAC,,, | Performed by: NURSE PRACTITIONER

## 2021-11-22 PROCEDURE — 99396 PR PREVENTIVE VISIT,EST,40-64: ICD-10-PCS | Mod: S$GLB,,, | Performed by: NURSE PRACTITIONER

## 2021-11-22 PROCEDURE — 99396 PREV VISIT EST AGE 40-64: CPT | Mod: S$GLB,,, | Performed by: NURSE PRACTITIONER

## 2021-11-22 PROCEDURE — 99999 PR PBB SHADOW E&M-EST. PATIENT-LVL III: ICD-10-PCS | Mod: PBBFAC,,, | Performed by: NURSE PRACTITIONER

## 2021-11-22 PROCEDURE — 4010F ACE/ARB THERAPY RXD/TAKEN: CPT | Mod: CPTII,S$GLB,, | Performed by: NURSE PRACTITIONER

## 2021-11-22 RX ORDER — LISINOPRIL AND HYDROCHLOROTHIAZIDE 10; 12.5 MG/1; MG/1
1 TABLET ORAL DAILY
Qty: 30 TABLET | Refills: 11 | Status: SHIPPED | OUTPATIENT
Start: 2021-11-22 | End: 2022-11-07

## 2021-11-22 RX ORDER — SERTRALINE HYDROCHLORIDE 100 MG/1
200 TABLET, FILM COATED ORAL DAILY
Qty: 60 TABLET | Refills: 11 | Status: SHIPPED | OUTPATIENT
Start: 2021-11-22 | End: 2022-11-07

## 2021-11-22 RX ORDER — SIMVASTATIN 20 MG/1
20 TABLET, FILM COATED ORAL NIGHTLY
Qty: 30 TABLET | Refills: 11 | Status: SHIPPED | OUTPATIENT
Start: 2021-11-22 | End: 2022-11-07

## 2022-11-08 ENCOUNTER — TELEPHONE (OUTPATIENT)
Dept: INTERNAL MEDICINE | Facility: CLINIC | Age: 62
End: 2022-11-08

## 2022-12-01 DIAGNOSIS — Z12.31 OTHER SCREENING MAMMOGRAM: ICD-10-CM

## 2022-12-21 ENCOUNTER — PATIENT OUTREACH (OUTPATIENT)
Dept: ADMINISTRATIVE | Facility: HOSPITAL | Age: 62
End: 2022-12-21

## 2023-02-09 DIAGNOSIS — F41.9 ANXIETY: ICD-10-CM

## 2023-02-09 DIAGNOSIS — E78.5 HYPERLIPIDEMIA, UNSPECIFIED HYPERLIPIDEMIA TYPE: ICD-10-CM

## 2023-02-09 DIAGNOSIS — I10 ESSENTIAL HYPERTENSION: ICD-10-CM

## 2023-02-09 RX ORDER — LISINOPRIL AND HYDROCHLOROTHIAZIDE 10; 12.5 MG/1; MG/1
TABLET ORAL
Qty: 90 TABLET | Refills: 0 | OUTPATIENT
Start: 2023-02-09

## 2023-02-09 RX ORDER — SERTRALINE HYDROCHLORIDE 100 MG/1
TABLET, FILM COATED ORAL
Qty: 180 TABLET | Refills: 0 | OUTPATIENT
Start: 2023-02-09

## 2023-02-09 RX ORDER — SIMVASTATIN 20 MG/1
TABLET, FILM COATED ORAL
Qty: 90 TABLET | Refills: 0 | OUTPATIENT
Start: 2023-02-09

## 2023-02-09 NOTE — TELEPHONE ENCOUNTER
Care Due:                  Date            Visit Type   Department     Provider  --------------------------------------------------------------------------------    Last Visit: None Found      None         None Found  Next Visit: None Scheduled  None         None Found                                                            Last  Test          Frequency    Reason                     Performed    Due Date  --------------------------------------------------------------------------------    Office Visit  12 months..  lisinopriL-hydrochlorothi  Not Found    Overdue                             azide, sertraline,                             simvastatin..............    CMP.........  12 months..  lisinopriL-hydrochlorothi  11-   11-                             azide, simvastatin.......    Lipid Panel.  12 months..  simvastatin..............  11-   11-    [facility] Embedded Care Gaps. Reference number: [id number]. 2/09/2023   12:38:28 AM CST

## 2023-09-20 DIAGNOSIS — Z78.0 MENOPAUSE: ICD-10-CM

## 2023-10-05 ENCOUNTER — PATIENT MESSAGE (OUTPATIENT)
Dept: INTERNAL MEDICINE | Facility: CLINIC | Age: 63
End: 2023-10-05
Payer: MEDICARE

## 2023-10-12 ENCOUNTER — HOSPITAL ENCOUNTER (OUTPATIENT)
Dept: RADIOLOGY | Facility: HOSPITAL | Age: 63
Discharge: HOME | End: 2023-10-12
Attending: FAMILY MEDICINE
Payer: MEDICARE

## 2023-10-12 VITALS — BODY MASS INDEX: 31.09 KG/M2 | WEIGHT: 170 LBS

## 2023-10-12 DIAGNOSIS — Z12.31 OTHER SCREENING MAMMOGRAM: ICD-10-CM

## 2023-10-12 PROCEDURE — 77067 SCR MAMMO BI INCL CAD: CPT | Mod: TC,HCNC

## 2023-10-12 PROCEDURE — 77063 MAMMO DIGITAL SCREENING BILAT WITH TOMO: ICD-10-PCS | Mod: 26,HCNC,, | Performed by: RADIOLOGY

## 2023-10-12 PROCEDURE — 77067 MAMMO DIGITAL SCREENING BILAT WITH TOMO: ICD-10-PCS | Mod: 26,HCNC,, | Performed by: RADIOLOGY

## 2023-10-12 PROCEDURE — 77063 BREAST TOMOSYNTHESIS BI: CPT | Mod: 26,HCNC,, | Performed by: RADIOLOGY

## 2023-10-12 PROCEDURE — 77067 SCR MAMMO BI INCL CAD: CPT | Mod: 26,HCNC,, | Performed by: RADIOLOGY

## 2023-10-16 ENCOUNTER — OFFICE VISIT (OUTPATIENT)
Dept: INTERNAL MEDICINE | Facility: CLINIC | Age: 63
End: 2023-10-16
Payer: MEDICARE

## 2023-10-16 ENCOUNTER — PATIENT MESSAGE (OUTPATIENT)
Dept: ADMINISTRATIVE | Facility: OTHER | Age: 63
End: 2023-10-16
Payer: MEDICARE

## 2023-10-16 VITALS
BODY MASS INDEX: 32.99 KG/M2 | OXYGEN SATURATION: 98 % | SYSTOLIC BLOOD PRESSURE: 140 MMHG | RESPIRATION RATE: 15 BRPM | TEMPERATURE: 98 F | WEIGHT: 179.25 LBS | DIASTOLIC BLOOD PRESSURE: 70 MMHG | HEIGHT: 62 IN | HEART RATE: 60 BPM

## 2023-10-16 DIAGNOSIS — E78.5 HYPERLIPIDEMIA, UNSPECIFIED HYPERLIPIDEMIA TYPE: ICD-10-CM

## 2023-10-16 DIAGNOSIS — Z23 NEED FOR PROPHYLACTIC VACCINATION AND INOCULATION AGAINST INFLUENZA: ICD-10-CM

## 2023-10-16 DIAGNOSIS — Z00.00 PREVENTATIVE HEALTH CARE: Primary | ICD-10-CM

## 2023-10-16 DIAGNOSIS — I10 PRIMARY HYPERTENSION: ICD-10-CM

## 2023-10-16 DIAGNOSIS — E66.9 CLASS 1 OBESITY WITH SERIOUS COMORBIDITY AND BODY MASS INDEX (BMI) OF 33.0 TO 33.9 IN ADULT, UNSPECIFIED OBESITY TYPE: ICD-10-CM

## 2023-10-16 DIAGNOSIS — Z23 NEED FOR PNEUMOCOCCAL 20-VALENT CONJUGATE VACCINATION: ICD-10-CM

## 2023-10-16 DIAGNOSIS — F41.9 ANXIETY: ICD-10-CM

## 2023-10-16 PROCEDURE — 1101F PT FALLS ASSESS-DOCD LE1/YR: CPT | Mod: HCNC,CPTII,S$GLB, | Performed by: FAMILY MEDICINE

## 2023-10-16 PROCEDURE — 3044F HG A1C LEVEL LT 7.0%: CPT | Mod: HCNC,CPTII,S$GLB, | Performed by: FAMILY MEDICINE

## 2023-10-16 PROCEDURE — 3288F FALL RISK ASSESSMENT DOCD: CPT | Mod: HCNC,CPTII,S$GLB, | Performed by: FAMILY MEDICINE

## 2023-10-16 PROCEDURE — 1159F PR MEDICATION LIST DOCUMENTED IN MEDICAL RECORD: ICD-10-PCS | Mod: HCNC,CPTII,S$GLB, | Performed by: FAMILY MEDICINE

## 2023-10-16 PROCEDURE — G0009 ADMIN PNEUMOCOCCAL VACCINE: HCPCS | Mod: HCNC,S$GLB,, | Performed by: FAMILY MEDICINE

## 2023-10-16 PROCEDURE — 99999 PR PBB SHADOW E&M-EST. PATIENT-LVL IV: ICD-10-PCS | Mod: PBBFAC,HCNC,, | Performed by: FAMILY MEDICINE

## 2023-10-16 PROCEDURE — 1101F PR PT FALLS ASSESS DOC 0-1 FALLS W/OUT INJ PAST YR: ICD-10-PCS | Mod: HCNC,CPTII,S$GLB, | Performed by: FAMILY MEDICINE

## 2023-10-16 PROCEDURE — 99999 PR PBB SHADOW E&M-EST. PATIENT-LVL IV: CPT | Mod: PBBFAC,HCNC,, | Performed by: FAMILY MEDICINE

## 2023-10-16 PROCEDURE — 3288F PR FALLS RISK ASSESSMENT DOCUMENTED: ICD-10-PCS | Mod: HCNC,CPTII,S$GLB, | Performed by: FAMILY MEDICINE

## 2023-10-16 PROCEDURE — 3077F PR MOST RECENT SYSTOLIC BLOOD PRESSURE >= 140 MM HG: ICD-10-PCS | Mod: HCNC,CPTII,S$GLB, | Performed by: FAMILY MEDICINE

## 2023-10-16 PROCEDURE — G0008 ADMIN INFLUENZA VIRUS VAC: HCPCS | Mod: HCNC,S$GLB,, | Performed by: FAMILY MEDICINE

## 2023-10-16 PROCEDURE — 99397 PER PM REEVAL EST PAT 65+ YR: CPT | Mod: 25,HCNC,S$GLB, | Performed by: FAMILY MEDICINE

## 2023-10-16 PROCEDURE — 1160F PR REVIEW ALL MEDS BY PRESCRIBER/CLIN PHARMACIST DOCUMENTED: ICD-10-PCS | Mod: HCNC,CPTII,S$GLB, | Performed by: FAMILY MEDICINE

## 2023-10-16 PROCEDURE — 3008F BODY MASS INDEX DOCD: CPT | Mod: HCNC,CPTII,S$GLB, | Performed by: FAMILY MEDICINE

## 2023-10-16 PROCEDURE — G0008 FLU VACCINE - QUADRIVALENT - ADJUVANTED: ICD-10-PCS | Mod: HCNC,S$GLB,, | Performed by: FAMILY MEDICINE

## 2023-10-16 PROCEDURE — 1159F MED LIST DOCD IN RCRD: CPT | Mod: HCNC,CPTII,S$GLB, | Performed by: FAMILY MEDICINE

## 2023-10-16 PROCEDURE — 90677 PNEUMOCOCCAL CONJUGATE VACCINE 20-VALENT: ICD-10-PCS | Mod: HCNC,S$GLB,, | Performed by: FAMILY MEDICINE

## 2023-10-16 PROCEDURE — 90677 PCV20 VACCINE IM: CPT | Mod: HCNC,S$GLB,, | Performed by: FAMILY MEDICINE

## 2023-10-16 PROCEDURE — 3077F SYST BP >= 140 MM HG: CPT | Mod: HCNC,CPTII,S$GLB, | Performed by: FAMILY MEDICINE

## 2023-10-16 PROCEDURE — 3078F PR MOST RECENT DIASTOLIC BLOOD PRESSURE < 80 MM HG: ICD-10-PCS | Mod: HCNC,CPTII,S$GLB, | Performed by: FAMILY MEDICINE

## 2023-10-16 PROCEDURE — 99397 PR PREVENTIVE VISIT,EST,65 & OVER: ICD-10-PCS | Mod: 25,HCNC,S$GLB, | Performed by: FAMILY MEDICINE

## 2023-10-16 PROCEDURE — 90694 FLU VACCINE - QUADRIVALENT - ADJUVANTED: ICD-10-PCS | Mod: HCNC,S$GLB,, | Performed by: FAMILY MEDICINE

## 2023-10-16 PROCEDURE — 3044F PR MOST RECENT HEMOGLOBIN A1C LEVEL <7.0%: ICD-10-PCS | Mod: HCNC,CPTII,S$GLB, | Performed by: FAMILY MEDICINE

## 2023-10-16 PROCEDURE — G0009 PNEUMOCOCCAL CONJUGATE VACCINE 20-VALENT: ICD-10-PCS | Mod: HCNC,S$GLB,, | Performed by: FAMILY MEDICINE

## 2023-10-16 PROCEDURE — 90694 VACC AIIV4 NO PRSRV 0.5ML IM: CPT | Mod: HCNC,S$GLB,, | Performed by: FAMILY MEDICINE

## 2023-10-16 PROCEDURE — 3078F DIAST BP <80 MM HG: CPT | Mod: HCNC,CPTII,S$GLB, | Performed by: FAMILY MEDICINE

## 2023-10-16 PROCEDURE — 3008F PR BODY MASS INDEX (BMI) DOCUMENTED: ICD-10-PCS | Mod: HCNC,CPTII,S$GLB, | Performed by: FAMILY MEDICINE

## 2023-10-16 PROCEDURE — 1160F RVW MEDS BY RX/DR IN RCRD: CPT | Mod: HCNC,CPTII,S$GLB, | Performed by: FAMILY MEDICINE

## 2023-10-16 RX ORDER — SERTRALINE HYDROCHLORIDE 100 MG/1
200 TABLET, FILM COATED ORAL DAILY
Qty: 180 TABLET | Refills: 3 | Status: SHIPPED | OUTPATIENT
Start: 2023-10-16

## 2023-10-16 RX ORDER — LISINOPRIL AND HYDROCHLOROTHIAZIDE 10; 12.5 MG/1; MG/1
1 TABLET ORAL DAILY
Qty: 90 TABLET | Refills: 3 | Status: SHIPPED | OUTPATIENT
Start: 2023-10-16

## 2023-10-16 RX ORDER — SIMVASTATIN 20 MG/1
20 TABLET, FILM COATED ORAL NIGHTLY
Qty: 90 TABLET | Refills: 3 | Status: SHIPPED | OUTPATIENT
Start: 2023-10-16

## 2023-10-16 NOTE — PROGRESS NOTES
Subjective     Patient ID: Claudia Álvarez is a 63 y.o. female.    Chief Complaint: Annual Exam (Med refills)    HPI 63-year-old white female presents to clinic today for annual physical exam.  She was last seen in November 2020.  She was previously treated for hypertension with lisinopril-hydrochlorothiazide 100/12.5 mg daily but is currently out of medication.  She currently denies any headaches, dizziness, chest pain, shortness breath, or palpitations.  She is been treated for hyperlipidemia in the past with simvastatin 20 mg daily which will be restarted.  Anxiety remains stable on Zoloft 200 mg daily.  She has a past surgical history of cholecystectomy, bilateral tubal ligation, and right knee arthroscopic surgery.  She has a family history of early coronary artery disease in her father who had a heart attack at the age of 65.  Her mother had hypertension and diabetes.  Flu vaccine has been given today as has Prevnar 20.    Review of Systems   Constitutional:  Negative for appetite change, chills, fatigue and fever.   HENT:  Negative for nasal congestion, ear pain, hearing loss, postnasal drip, rhinorrhea, sinus pressure/congestion, sore throat and tinnitus.    Eyes:  Negative for redness, itching and visual disturbance.   Respiratory:  Negative for cough, chest tightness and shortness of breath.    Cardiovascular:  Negative for chest pain and palpitations.   Gastrointestinal:  Negative for abdominal pain, constipation, diarrhea, nausea and vomiting.   Genitourinary:  Negative for decreased urine volume, difficulty urinating, dysuria, frequency, hematuria and urgency.   Musculoskeletal:  Negative for back pain, myalgias, neck pain and neck stiffness.   Integumentary:  Negative for rash.   Neurological:  Negative for dizziness, light-headedness and headaches.   Psychiatric/Behavioral: Negative.            Objective     Physical Exam  Vitals and nursing note reviewed.   Constitutional:       General: She is not  in acute distress.     Appearance: She is well-developed. She is not diaphoretic.   HENT:      Head: Normocephalic and atraumatic.      Right Ear: External ear normal.      Left Ear: External ear normal.      Nose: Nose normal.      Mouth/Throat:      Pharynx: No oropharyngeal exudate.   Eyes:      General: No scleral icterus.        Right eye: No discharge.         Left eye: No discharge.      Conjunctiva/sclera: Conjunctivae normal.      Pupils: Pupils are equal, round, and reactive to light.   Neck:      Thyroid: No thyromegaly.      Vascular: No JVD.      Trachea: No tracheal deviation.   Cardiovascular:      Rate and Rhythm: Normal rate and regular rhythm.      Heart sounds: Normal heart sounds. No murmur heard.     No friction rub. No gallop.   Pulmonary:      Effort: Pulmonary effort is normal. No respiratory distress.      Breath sounds: Normal breath sounds. No stridor. No wheezing or rales.   Abdominal:      General: Bowel sounds are normal. There is no distension.      Palpations: Abdomen is soft. There is no mass.      Tenderness: There is no abdominal tenderness. There is no guarding or rebound.   Musculoskeletal:         General: No tenderness. Normal range of motion.      Cervical back: Normal range of motion and neck supple.   Lymphadenopathy:      Cervical: No cervical adenopathy.   Skin:     General: Skin is warm and dry.      Coloration: Skin is not pale.      Findings: No erythema or rash.   Neurological:      Mental Status: She is alert and oriented to person, place, and time.   Psychiatric:         Behavior: Behavior normal.         Thought Content: Thought content normal.         Judgment: Judgment normal.            Assessment and Plan     1. Preventative health care    2. Primary hypertension  -     lisinopriL-hydrochlorothiazide (PRINZIDE,ZESTORETIC) 10-12.5 mg per tablet; Take 1 tablet by mouth once daily.  Dispense: 90 tablet; Refill: 3  -     Hypertension Digital Medicine (HDMP)  Enrollment Order  -     Hypertension Digital Medicine (HDMP): Assign Onboarding Questionnaires    3. Hyperlipidemia, unspecified hyperlipidemia type  -     simvastatin (ZOCOR) 20 MG tablet; Take 1 tablet (20 mg total) by mouth every evening.  Dispense: 90 tablet; Refill: 3    4. Anxiety  -     sertraline (ZOLOFT) 100 MG tablet; Take 2 tablets (200 mg total) by mouth once daily.  Dispense: 180 tablet; Refill: 3    5. Class 3 severe obesity without serious comorbidity with body mass index (BMI) of 40.0 to 44.9 in adult, unspecified obesity type    6. Need for pneumococcal 20-valent conjugate vaccination  -     (In Office Administered) Pneumococcal Conjugate Vaccine (20 Valent) (IM) (Preferred)    7. Need for prophylactic vaccination and inoculation against influenza    Other orders  -     Influenza - Quadrivalent (Adjuvanted)        1. Labs have been reviewed and are overall within normal limits.    2. Restart lisinopril-hydrochlorothiazide 10/12.5 mg daily.  Refer to digital medicine program for blood pressure monitoring.    3. Continue simvastatin 20 mg nightly.  Hyperlipidemia stable.    4. Continue Zoloft 200 mg daily.  Anxiety is stable.  5. Continue diet and exercise.  6. Prevnar 20 given.    7. Flu vaccine given.    8. Return to clinic as needed or in 1 year for annual physical exam.             Follow up in about 1 year (around 10/16/2024), or if symptoms worsen or fail to improve, for Annual exam.

## 2024-01-12 ENCOUNTER — OFFICE VISIT (OUTPATIENT)
Dept: INTERNAL MEDICINE | Facility: CLINIC | Age: 64
End: 2024-01-12
Payer: MEDICARE

## 2024-01-12 ENCOUNTER — HOSPITAL ENCOUNTER (OUTPATIENT)
Dept: RADIOLOGY | Facility: HOSPITAL | Age: 64
Discharge: HOME | End: 2024-01-12
Attending: NURSE PRACTITIONER
Payer: MEDICARE

## 2024-01-12 VITALS
WEIGHT: 175.5 LBS | OXYGEN SATURATION: 97 % | HEART RATE: 67 BPM | RESPIRATION RATE: 18 BRPM | SYSTOLIC BLOOD PRESSURE: 120 MMHG | DIASTOLIC BLOOD PRESSURE: 68 MMHG | TEMPERATURE: 99 F | HEIGHT: 62 IN | BODY MASS INDEX: 32.3 KG/M2

## 2024-01-12 DIAGNOSIS — J02.0 STREP PHARYNGITIS: Primary | ICD-10-CM

## 2024-01-12 DIAGNOSIS — R59.0 POSTERIOR CERVICAL LYMPHADENOPATHY: ICD-10-CM

## 2024-01-12 DIAGNOSIS — R13.13 PHARYNGEAL DYSPHAGIA: ICD-10-CM

## 2024-01-12 DIAGNOSIS — R22.1 LOCALIZED SWELLING, MASS AND LUMP, NECK: ICD-10-CM

## 2024-01-12 DIAGNOSIS — E66.9 OBESITY (BMI 30-39.9): ICD-10-CM

## 2024-01-12 LAB
CTP QC/QA: YES
S PYO RRNA THROAT QL PROBE: POSITIVE

## 2024-01-12 PROCEDURE — 1159F MED LIST DOCD IN RCRD: CPT | Mod: CPTII,S$GLB,, | Performed by: NURSE PRACTITIONER

## 2024-01-12 PROCEDURE — 1160F RVW MEDS BY RX/DR IN RCRD: CPT | Mod: CPTII,S$GLB,, | Performed by: NURSE PRACTITIONER

## 2024-01-12 PROCEDURE — 76536 US EXAM OF HEAD AND NECK: CPT | Mod: TC

## 2024-01-12 PROCEDURE — 1125F AMNT PAIN NOTED PAIN PRSNT: CPT | Mod: CPTII,S$GLB,, | Performed by: NURSE PRACTITIONER

## 2024-01-12 PROCEDURE — 3074F SYST BP LT 130 MM HG: CPT | Mod: CPTII,S$GLB,, | Performed by: NURSE PRACTITIONER

## 2024-01-12 PROCEDURE — 3078F DIAST BP <80 MM HG: CPT | Mod: CPTII,S$GLB,, | Performed by: NURSE PRACTITIONER

## 2024-01-12 PROCEDURE — 1101F PT FALLS ASSESS-DOCD LE1/YR: CPT | Mod: CPTII,S$GLB,, | Performed by: NURSE PRACTITIONER

## 2024-01-12 PROCEDURE — 99214 OFFICE O/P EST MOD 30 MIN: CPT | Mod: 25,S$GLB,, | Performed by: NURSE PRACTITIONER

## 2024-01-12 PROCEDURE — 76536 US EXAM OF HEAD AND NECK: CPT | Mod: 26,,, | Performed by: RADIOLOGY

## 2024-01-12 PROCEDURE — 4010F ACE/ARB THERAPY RXD/TAKEN: CPT | Mod: CPTII,S$GLB,, | Performed by: NURSE PRACTITIONER

## 2024-01-12 PROCEDURE — 96372 THER/PROPH/DIAG INJ SC/IM: CPT | Mod: S$GLB,,, | Performed by: NURSE PRACTITIONER

## 2024-01-12 PROCEDURE — 87880 STREP A ASSAY W/OPTIC: CPT | Mod: QW,S$GLB,, | Performed by: NURSE PRACTITIONER

## 2024-01-12 PROCEDURE — 3288F FALL RISK ASSESSMENT DOCD: CPT | Mod: CPTII,S$GLB,, | Performed by: NURSE PRACTITIONER

## 2024-01-12 PROCEDURE — 99999 PR PBB SHADOW E&M-EST. PATIENT-LVL V: CPT | Mod: PBBFAC,,, | Performed by: NURSE PRACTITIONER

## 2024-01-12 PROCEDURE — 3008F BODY MASS INDEX DOCD: CPT | Mod: CPTII,S$GLB,, | Performed by: NURSE PRACTITIONER

## 2024-01-12 RX ORDER — BETAMETHASONE SODIUM PHOSPHATE AND BETAMETHASONE ACETATE 3; 3 MG/ML; MG/ML
6 INJECTION, SUSPENSION INTRA-ARTICULAR; INTRALESIONAL; INTRAMUSCULAR; SOFT TISSUE
Status: COMPLETED | OUTPATIENT
Start: 2024-01-12 | End: 2024-01-12

## 2024-01-12 RX ORDER — AMOXICILLIN AND CLAVULANATE POTASSIUM 400; 57 MG/5ML; MG/5ML
800 POWDER, FOR SUSPENSION ORAL EVERY 12 HOURS
Qty: 140 ML | Refills: 0 | Status: SHIPPED | OUTPATIENT
Start: 2024-01-12 | End: 2024-01-19

## 2024-01-12 RX ADMIN — BETAMETHASONE SODIUM PHOSPHATE AND BETAMETHASONE ACETATE 6 MG: 3; 3 INJECTION, SUSPENSION INTRA-ARTICULAR; INTRALESIONAL; INTRAMUSCULAR; SOFT TISSUE at 10:01

## 2024-01-12 NOTE — PATIENT INSTRUCTIONS
Strep test is positive    Celestone 6mg IM given in office for neck swelling    Check ultrasound of neck due to trouble swallowing, will message with results    Start Augmentin 2 tsp twice a day for 7 days, after day 1, two doses, discard toothbrush and use a new one    Warm salt water gargles as needed for throat pain    Tylenol or ibuprofen liquid otc as needed for pain

## 2024-01-12 NOTE — PROGRESS NOTES
Subjective     Patient ID: Claudia Álvarez is a 63 y.o. female.    Chief Complaint: Sore Throat (Sore throat/Pt stated that she can not swallow. She has seen her PCP for the same issue that not resolve. Have been having off and on throat soreness for the last 2-3 weeks. )    Pt of Dr. Ayoub, here for sore throat /earache    Sore Throat   This is a recurrent problem. The current episode started 1 to 4 weeks ago (2-3 weeks). The problem has been unchanged. There has been no fever. The pain is at a severity of 9/10. The pain is severe. Associated symptoms include coughing, ear pain, a hoarse voice, neck pain, swollen glands and trouble swallowing. Pertinent negatives include no abdominal pain, congestion, diarrhea, drooling, ear discharge, headaches, shortness of breath or stridor. She has had no exposure to strep or mono. She has tried acetaminophen for the symptoms. The treatment provided no relief.     Review of Systems   Constitutional:  Negative for activity change, appetite change, chills, diaphoresis, fatigue, fever and unexpected weight change.   HENT:  Positive for ear pain, hoarse voice, postnasal drip, sore throat, trouble swallowing and voice change. Negative for nasal congestion, drooling, ear discharge, hearing loss, mouth sores, rhinorrhea, sinus pressure/congestion, sneezing and tinnitus.    Respiratory:  Positive for cough. Negative for shortness of breath and stridor.    Cardiovascular:  Negative for chest pain.   Gastrointestinal:  Negative for abdominal pain, diarrhea and nausea.   Musculoskeletal:  Positive for arthralgias and neck pain. Negative for myalgias.   Allergic/Immunologic: Positive for environmental allergies. Negative for food allergies and immunocompromised state.        Sleeps with fan, shedding pets in home, exposed to second hand smoke but  recently quit at the beginning of this year   Neurological:  Negative for dizziness, syncope, weakness, light-headedness, numbness and  headaches.   Hematological:  Positive for adenopathy. Does not bruise/bleed easily.   Psychiatric/Behavioral:  Negative for suicidal ideas.        Review of patient's allergies indicates:  No Known Allergies    Current Outpatient Medications:     cholecalciferol, vitamin D3, 1,000 unit capsule, Take 1,000 Units by mouth once daily., Disp: , Rfl:     COLLAGEN-BIOTIN-ASCORBIC ACID ORAL, , Disp: , Rfl:     lisinopriL-hydrochlorothiazide (PRINZIDE,ZESTORETIC) 10-12.5 mg per tablet, Take 1 tablet by mouth once daily., Disp: 90 tablet, Rfl: 3    multivitamin (THERAGRAN) per tablet, Take 1 tablet by mouth once daily., Disp: , Rfl:     sertraline (ZOLOFT) 100 MG tablet, Take 2 tablets (200 mg total) by mouth once daily., Disp: 180 tablet, Rfl: 3    simvastatin (ZOCOR) 20 MG tablet, Take 1 tablet (20 mg total) by mouth every evening., Disp: 90 tablet, Rfl: 3    vitamin B complex (SUPER B COMPLEX-B-12 ORAL), , Disp: , Rfl:     Patient Active Problem List   Diagnosis    HTN (hypertension)    Psoriasis    Family history of early CAD    Anxiety    Hyperlipidemia    Class 1 obesity with serious comorbidity and body mass index (BMI) of 33.0 to 33.9 in adult    Scalp abscess       Past Medical History:   Diagnosis Date    Anxiety     Depression     Hypertension     Psoriasis        Past Surgical History:   Procedure Laterality Date    CHOLECYSTECTOMY      KNEE SURGERY Right 01/01/2004    Dr. [facility]/arthroscopy    TUBAL LIGATION         Social History     Socioeconomic History    Marital status:    Occupational History     Employer: [employer]   Tobacco Use    Smoking status: Never    Smokeless tobacco: Never   Substance and Sexual Activity    Alcohol use: Yes     Comment: rare    Drug use: Never    Sexual activity: Not Currently     Partners: Male     Birth control/protection: Post-menopausal, None       Family History   Problem Relation Age of Onset    Hypertension Mother     Diabetes Mother     Arthritis Mother     Heart  "disease Mother     Hypertension Father     Heart disease Father 65        MI    Kidney disease Father 60    Melanoma Daughter     Depression Sister     Mental illness Sister     Diabetes Brother        Objective     Vitals:    01/12/24 1009   BP: 120/68   Pulse: 67   Resp: 18   Temp: 98.5 °F (36.9 °C)   TempSrc: Oral   SpO2: 97%   Weight: 79.6 kg (175 lb 7.8 oz)   Height: 5' 1.5" (1.562 m)   PainSc:   9   PainLoc: Throat       Body mass index is 32.62 kg/m².    Physical Exam  Vitals and nursing note reviewed.   Constitutional:       Appearance: She is obese.   HENT:      Head: Normocephalic.      Right Ear: Tympanic membrane, ear canal and external ear normal. There is no impacted cerumen.      Left Ear: Tympanic membrane, ear canal and external ear normal. There is no impacted cerumen.      Nose: Nose normal. No congestion or rhinorrhea.      Mouth/Throat:      Mouth: Mucous membranes are moist.      Pharynx: Oropharynx is clear. Posterior oropharyngeal erythema present. No oropharyngeal exudate.      Comments: Clear PND noted on exam      Eyes:      Conjunctiva/sclera: Conjunctivae normal.   Neck:      Vascular: No carotid bruit.   Cardiovascular:      Heart sounds: Normal heart sounds.   Pulmonary:      Effort: Pulmonary effort is normal.      Breath sounds: Normal breath sounds.   Musculoskeletal:         General: Normal range of motion.      Cervical back: Normal range of motion. Tenderness present. No rigidity.   Lymphadenopathy:      Cervical: Cervical adenopathy present.      Right cervical: Posterior cervical adenopathy present.      Left cervical: Posterior cervical adenopathy present.   Skin:     General: Skin is warm and dry.   Neurological:      General: No focal deficit present.      Mental Status: She is alert and oriented to person, place, and time.   Psychiatric:         Mood and Affect: Mood normal.         Behavior: Behavior normal.         Thought Content: Thought content normal.         " Judgment: Judgment normal.         Strep test in office positive     Assessment and Plan     1. Strep pharyngitis  -     POCT Rapid Strep A  -     amoxicillin-clavulanate (AUGMENTIN) 400-57 mg/5 mL SusR; Take 10 mLs (800 mg total) by mouth every 12 (twelve) hours. for 7 days  Dispense: 140 mL; Refill: 0    2. Posterior cervical lymphadenopathy  -     amoxicillin-clavulanate (AUGMENTIN) 400-57 mg/5 mL SusR; Take 10 mLs (800 mg total) by mouth every 12 (twelve) hours. for 7 days  Dispense: 140 mL; Refill: 0  -     betamethasone acetate-betamethasone sodium phosphate injection 6 mg  -     US Soft Tissue Head Neck Thyroid; Future; Expected date: 01/12/2024    3. Pharyngeal dysphagia  -     amoxicillin-clavulanate (AUGMENTIN) 400-57 mg/5 mL SusR; Take 10 mLs (800 mg total) by mouth every 12 (twelve) hours. for 7 days  Dispense: 140 mL; Refill: 0  -     US Soft Tissue Head Neck Thyroid; Future; Expected date: 01/12/2024    4. BMI 32.0-32.9,adult  BMI reviewed    5. Obesity (BMI 30-39.9)  BMI reviewed.    Diet and exercise to lose weight.    6. Localized swelling, mass and lump, neck  -     amoxicillin-clavulanate (AUGMENTIN) 400-57 mg/5 mL SusR; Take 10 mLs (800 mg total) by mouth every 12 (twelve) hours. for 7 days  Dispense: 140 mL; Refill: 0  -     US Soft Tissue Head Neck Thyroid; Future; Expected date: 01/12/2024    Strep test is positive    Celestone 6mg IM given in office for neck swelling    Check ultrasound of neck due to trouble swallowing, will message with results    Start Augmentin 2 tsp twice a day for 7 days, after day 1, two doses, discard toothbrush and use a new one    Warm salt water gargles as needed for throat pain    Tylenol or ibuprofen liquid otc as needed for pain    Self care instructions provided in AVS       Follow up if symptoms worsen or fail to improve.

## 2024-06-20 ENCOUNTER — TELEPHONE (OUTPATIENT)
Dept: INTERNAL MEDICINE | Facility: CLINIC | Age: 64
End: 2024-06-20
Payer: MEDICARE

## 2024-06-20 NOTE — TELEPHONE ENCOUNTER
Visit has been scheduled , pt reports 2 falls that are now causing right knee pain . She has had surgery on the same knee 19 years ago pain is not getting any better .

## 2024-06-20 NOTE — TELEPHONE ENCOUNTER
----- Message from Perla Hensley sent at 6/20/2024  3:41 PM CDT -----  Contact: [phone]  Type: Returning a call    Who left a message? Missed call     When did the practice call? Today     Does patient know what this is regarding: rescheduling but states need to speak with the nurse before rescheduling     Would the patient rather a call back or a response via [facility]? Call back     Comments:    Thank you

## 2024-06-20 NOTE — TELEPHONE ENCOUNTER
----- Message from Meghna Colon sent at 6/20/2024  4:17 PM CDT -----  Contact: Self [phone]  Patient is returning a phone call.    Who left a message for the patient: nurse    Does patient know what this is regarding:  rescheduling     Would you like a call back, or a response through your [facility] portal?:   call back    Comments:

## 2024-06-25 ENCOUNTER — HOSPITAL ENCOUNTER (OUTPATIENT)
Dept: RADIOLOGY | Facility: HOSPITAL | Age: 64
Discharge: HOME | End: 2024-06-25
Attending: FAMILY MEDICINE
Payer: MEDICARE

## 2024-06-25 ENCOUNTER — OFFICE VISIT (OUTPATIENT)
Dept: INTERNAL MEDICINE | Facility: CLINIC | Age: 64
End: 2024-06-25
Payer: MEDICARE

## 2024-06-25 VITALS
HEIGHT: 62 IN | BODY MASS INDEX: 33.51 KG/M2 | RESPIRATION RATE: 16 BRPM | DIASTOLIC BLOOD PRESSURE: 60 MMHG | HEART RATE: 70 BPM | OXYGEN SATURATION: 97 % | TEMPERATURE: 98 F | WEIGHT: 182.13 LBS | SYSTOLIC BLOOD PRESSURE: 112 MMHG
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
## 2024-06-25 DIAGNOSIS — S89.91XA INJURY OF RIGHT KNEE, INITIAL ENCOUNTER: ICD-10-CM
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
## 2024-06-25 PROCEDURE — 1160F RVW MEDS BY RX/DR IN RCRD: CPT | Mod: CPTII,S$GLB,, | Performed by: FAMILY MEDICINE

## 2024-06-25 PROCEDURE — 99999 PR PBB SHADOW E&M-EST. PATIENT-LVL IV: CPT | Mod: PBBFAC,,, | Performed by: FAMILY MEDICINE

## 2024-06-25 PROCEDURE — 4010F ACE/ARB THERAPY RXD/TAKEN: CPT | Mod: CPTII,S$GLB,, | Performed by: FAMILY MEDICINE

## 2024-06-25 PROCEDURE — 99214 OFFICE O/P EST MOD 30 MIN: CPT | Mod: S$GLB,,, | Performed by: FAMILY MEDICINE

## 2024-06-25 PROCEDURE — 1159F MED LIST DOCD IN RCRD: CPT | Mod: CPTII,S$GLB,, | Performed by: FAMILY MEDICINE

## 2024-06-25 PROCEDURE — 3008F BODY MASS INDEX DOCD: CPT | Mod: CPTII,S$GLB,, | Performed by: FAMILY MEDICINE

## 2024-06-25 PROCEDURE — 1100F PTFALLS ASSESS-DOCD GE2>/YR: CPT | Mod: CPTII,S$GLB,, | Performed by: FAMILY MEDICINE

## 2024-06-25 PROCEDURE — 1125F AMNT PAIN NOTED PAIN PRSNT: CPT | Mod: CPTII,S$GLB,, | Performed by: FAMILY MEDICINE

## 2024-06-25 PROCEDURE — 73562 X-RAY EXAM OF KNEE 3: CPT | Mod: 26,RT,, | Performed by: RADIOLOGY

## 2024-06-25 PROCEDURE — 73562 X-RAY EXAM OF KNEE 3: CPT | Mod: TC,PO,RT

## 2024-06-25 PROCEDURE — 3078F DIAST BP <80 MM HG: CPT | Mod: CPTII,S$GLB,, | Performed by: FAMILY MEDICINE

## 2024-06-25 PROCEDURE — 3288F FALL RISK ASSESSMENT DOCD: CPT | Mod: CPTII,S$GLB,, | Performed by: FAMILY MEDICINE

## 2024-06-25 PROCEDURE — 3074F SYST BP LT 130 MM HG: CPT | Mod: CPTII,S$GLB,, | Performed by: FAMILY MEDICINE

## 2024-06-25 RX ORDER — MELOXICAM 15 MG/1
15 TABLET ORAL DAILY
Qty: 10 TABLET | Refills: 0 | Status: SHIPPED | OUTPATIENT
Start: 2024-06-25 | End: 2024-07-05

## 2024-06-25 NOTE — PROGRESS NOTES
Subjective     Patient ID: Claudia Álvarez is a 63 y.o. female.    Chief Complaint: right knee pain    HPI 63-year-old white female presents to clinic today secondary to a complaint of continued right knee pain status post two falls.  She reports that she suffered her 1st fall in April and her 2nd fall mother's day weekend.  In both instances she landed on her right knee.  Since that time she has been using a leave, Tylenol, Motrin, heat, ice, and a copper fit compression sleeve without relief.  She notes the pain worse with walking and notes difficulty sleeping secondary to the knee pain.  She rates her pain at a 5/10.  Review of Systems   Constitutional:  Negative for appetite change, chills, fatigue and fever.   HENT:  Negative for nasal congestion, ear pain, hearing loss, postnasal drip, rhinorrhea, sinus pressure/congestion, sore throat and tinnitus.    Eyes:  Negative for redness, itching and visual disturbance.   Respiratory:  Negative for cough, chest tightness and shortness of breath.    Cardiovascular:  Negative for chest pain and palpitations.   Gastrointestinal:  Negative for abdominal pain, constipation, diarrhea, nausea and vomiting.   Genitourinary:  Negative for decreased urine volume, difficulty urinating, dysuria, frequency, hematuria and urgency.   Musculoskeletal:  Positive for arthralgias (right knee). Negative for back pain, myalgias, neck pain and neck stiffness.   Integumentary:  Negative for rash.   Neurological:  Negative for dizziness, light-headedness and headaches.   Psychiatric/Behavioral: Negative.            Objective     Physical Exam  Constitutional:       Appearance: Normal appearance.   HENT:      Head: Normocephalic and atraumatic.   Pulmonary:      Effort: Pulmonary effort is normal.   Musculoskeletal:      Right knee: Swelling present. Tenderness present over the medial joint line. Abnormal patellar mobility.   Neurological:      Mental Status: She is alert and oriented to  person, place, and time.   Psychiatric:         Mood and Affect: Mood normal.         Behavior: Behavior normal.         Thought Content: Thought content normal.         Judgment: Judgment normal.            Assessment and Plan     1. Fall, initial encounter  -     X-Ray Knee 3 View Right; Future; Expected date: 06/25/2024    2. Injury of right knee, initial encounter  -     X-Ray Knee 3 View Right; Future; Expected date: 06/25/2024  -     meloxicam (MOBIC) 15 MG tablet; Take 1 tablet (15 mg total) by mouth once daily. for 10 days  Dispense: 10 tablet; Refill: 0        1. Right knee x-ray now.  2. Rest, ice, compression, and elevation.    3. Meloxicam 15 mg daily for 10 days.  4. Return to clinic as needed if symptoms persist or worsen.               Follow up if symptoms worsen or fail to improve.

## 2024-07-26 ENCOUNTER — HOSPITAL ENCOUNTER (OUTPATIENT)
Dept: RADIOLOGY | Facility: CLINIC | Age: 64
Discharge: HOME | End: 2024-07-26
Attending: FAMILY MEDICINE
Payer: MEDICARE

## 2024-07-26 DIAGNOSIS — Z78.0 MENOPAUSE: ICD-10-CM

## 2024-07-26 PROCEDURE — 77080 DXA BONE DENSITY AXIAL: CPT | Mod: TC,PO

## 2024-07-26 PROCEDURE — 77080 DXA BONE DENSITY AXIAL: CPT | Mod: 26,,, | Performed by: INTERNAL MEDICINE

## 2024-08-14 ENCOUNTER — OFFICE VISIT (OUTPATIENT)
Facility: CLINIC | Age: 64
End: 2024-08-14
Payer: MEDICARE

## 2024-08-14 VITALS
SYSTOLIC BLOOD PRESSURE: 108 MMHG | HEART RATE: 64 BPM | DIASTOLIC BLOOD PRESSURE: 70 MMHG | OXYGEN SATURATION: 96 % | WEIGHT: 181 LBS | BODY MASS INDEX: 33.31 KG/M2 | HEIGHT: 62 IN | RESPIRATION RATE: 18 BRPM

## 2024-08-14 DIAGNOSIS — Z00.00 ENCOUNTER FOR MEDICARE ANNUAL WELLNESS EXAM: ICD-10-CM

## 2024-08-14 DIAGNOSIS — R29.6 RECURRENT FALLS: ICD-10-CM

## 2024-08-14 DIAGNOSIS — L40.9 PSORIASIS: ICD-10-CM

## 2024-08-14 DIAGNOSIS — Z82.49 FAMILY HISTORY OF EARLY CAD: ICD-10-CM

## 2024-08-14 DIAGNOSIS — E78.5 HYPERLIPIDEMIA, UNSPECIFIED HYPERLIPIDEMIA TYPE: ICD-10-CM

## 2024-08-14 DIAGNOSIS — Z71.89 ADVANCED DIRECTIVES, COUNSELING/DISCUSSION: ICD-10-CM

## 2024-08-14 DIAGNOSIS — Z00.00 ENCOUNTER FOR PREVENTIVE HEALTH EXAMINATION: ICD-10-CM

## 2024-08-14 DIAGNOSIS — I10 PRIMARY HYPERTENSION: ICD-10-CM

## 2024-08-14 DIAGNOSIS — E66.9 CLASS 1 OBESITY WITH SERIOUS COMORBIDITY AND BODY MASS INDEX (BMI) OF 33.0 TO 33.9 IN ADULT, UNSPECIFIED OBESITY TYPE: ICD-10-CM

## 2024-08-14 DIAGNOSIS — F41.9 ANXIETY: ICD-10-CM

## 2024-08-14 PROCEDURE — 1100F PTFALLS ASSESS-DOCD GE2>/YR: CPT | Mod: CPTII,S$GLB,, | Performed by: NURSE PRACTITIONER

## 2024-08-14 PROCEDURE — 1159F MED LIST DOCD IN RCRD: CPT | Mod: CPTII,S$GLB,, | Performed by: NURSE PRACTITIONER

## 2024-08-14 PROCEDURE — 1170F FXNL STATUS ASSESSED: CPT | Mod: CPTII,S$GLB,, | Performed by: NURSE PRACTITIONER

## 2024-08-14 PROCEDURE — 99999 PR PBB SHADOW E&M-EST. PATIENT-LVL IV: CPT | Mod: PBBFAC,,, | Performed by: NURSE PRACTITIONER

## 2024-08-14 PROCEDURE — 3288F FALL RISK ASSESSMENT DOCD: CPT | Mod: CPTII,S$GLB,, | Performed by: NURSE PRACTITIONER

## 2024-08-14 PROCEDURE — 3078F DIAST BP <80 MM HG: CPT | Mod: CPTII,S$GLB,, | Performed by: NURSE PRACTITIONER

## 2024-08-14 PROCEDURE — G0402 INITIAL PREVENTIVE EXAM: HCPCS | Mod: S$GLB,,, | Performed by: NURSE PRACTITIONER

## 2024-08-14 PROCEDURE — 1125F AMNT PAIN NOTED PAIN PRSNT: CPT | Mod: CPTII,S$GLB,, | Performed by: NURSE PRACTITIONER

## 2024-08-14 PROCEDURE — 1160F RVW MEDS BY RX/DR IN RCRD: CPT | Mod: CPTII,S$GLB,, | Performed by: NURSE PRACTITIONER

## 2024-08-14 PROCEDURE — 3074F SYST BP LT 130 MM HG: CPT | Mod: CPTII,S$GLB,, | Performed by: NURSE PRACTITIONER

## 2024-08-14 PROCEDURE — 4010F ACE/ARB THERAPY RXD/TAKEN: CPT | Mod: CPTII,S$GLB,, | Performed by: NURSE PRACTITIONER

## 2024-08-14 NOTE — PATIENT INSTRUCTIONS
It was a pleasure to meet you.      Follow up in 1 year for AWV.     Please Follow up with PCP for Routine Care.      Please get needed vaccine done at local pharmacy.        The following information is provided to all patients.  This information is to help you find resources for any of the problems found today that may be affecting your health:                Living healthy guide: www.[facility].[place].[facility]       Understanding Diabetes: www.diabetes.org       Eating healthy: www.cdc.gov/healthyweight      CDC home safety checklist: www.cdc.gov/steadi/patient.html      Agency on Aging: www.goea.[place].[facility]       Alcoholics anonymous (AA): www.aa.org      Physical Activity: www.soumya.nih.gov/ne9zlup       Tobacco use: www.quitwithusla.org

## 2024-08-14 NOTE — PROGRESS NOTES
"  Claudia Álvarez presented for an initial Medicare AWV today. The following components were reviewed and updated:    Medical history  Family History  Social history  Allergies and Current Medications  Health Risk Assessment  Health Maintenance  Care Team    **See Completed Assessments for Annual Wellness visit with in the encounter summary    The following assessments were completed:  Living Situation  Depression Screening  Cognitive function Screening  Timed Get Up Test  Whisper Test  ADL  PAQ      Opioid documentation:      Patient does not have a current opioid prescription.          Vitals:    08/14/24 1323   BP: 108/70   BP Location: Left arm   Patient Position: Sitting   BP Method: Medium (Manual)   Pulse: 64   Resp: 18   SpO2: 96%   Weight: 82.1 kg (181 lb)   Height: 5' 2" (1.575 m)     Body mass index is 33.1 kg/m².     Review of Systems   Constitutional:  Negative for chills, fever and weight loss.   HENT:  Negative for ear discharge, ear pain and tinnitus.    Eyes:  Negative for blurred vision and double vision.   Respiratory:  Negative for cough and shortness of breath.    Cardiovascular:  Negative for chest pain, palpitations, orthopnea and leg swelling.   Gastrointestinal:  Negative for constipation and diarrhea.   Genitourinary:  Negative for dysuria and hematuria.   Musculoskeletal:  Negative for back pain and myalgias.   Skin:  Negative for itching and rash.   Neurological:  Negative for dizziness and headaches.   Endo/Heme/Allergies:  Does not bruise/bleed easily.   Psychiatric/Behavioral:  Negative for depression. The patient does not have insomnia.       Physical Exam  Constitutional:       General: She is not in acute distress.     Appearance: She is not ill-appearing, toxic-appearing or diaphoretic.   HENT:      Head: Normocephalic and atraumatic.      Right Ear: External ear normal.      Left Ear: External ear normal.      Nose: No congestion or rhinorrhea.   Eyes:      Pupils: Pupils are " equal, round, and reactive to light.   Cardiovascular:      Rate and Rhythm: Normal rate and regular rhythm.   Pulmonary:      Effort: No respiratory distress.      Breath sounds: No wheezing.   Abdominal:      General: Bowel sounds are normal.   Musculoskeletal:         General: Normal range of motion.      Cervical back: Normal range of motion.   Skin:     General: Skin is warm and dry.   Neurological:      General: No focal deficit present.      Mental Status: She is oriented to person, place, and time.   Psychiatric:         Mood and Affect: Mood normal.         Thought Content: Thought content normal.       Media Information    File Link    Scan on 8/14/2024  1:55 PM by Kiya Fair APRN: Clock Draw Test        Diagnoses and health risks identified today and associated recommendations/orders:  1. Encounter for Medicare annual wellness exam  -Counseled on age appropriate medical preventative services including age appropriate cancer screenings, age appropriate eye and dental exams, over all nutritional health, need for a consistent exercise regimen, and an over all push towards maintaining a vigorous and active lifestyle.  Counseled on age appropriate vaccines and discussed upcoming health care needs based on age/gender. Discussed good sleep hygiene and stress management.        2. Encounter for preventive health examination  -Counseled on age appropriate medical preventative services including age appropriate cancer screenings, age appropriate eye and dental exams, over all nutritional health, need for a consistent exercise regimen, and an over all push towards maintaining a vigorous and active lifestyle.  Counseled on age appropriate vaccines and discussed upcoming health care needs based on age/gender. Discussed good sleep hygiene and stress management.        3. Recurrent falls  -Patient was seen by PCP after falling and evaluated  -Discussed need for physical therapy, patient not interested at  this time as she does not need and is too busy    4. Primary hypertension  -The current medical regimen is effective. Continue present plan and medications.      5. Hyperlipidemia, unspecified hyperlipidemia type  -The current medical regimen is effective. Continue present plan and medications.      6. Anxiety  -The current medical regimen is effective. Continue present plan and medications.      7. Family history of early CAD  -Patient has no cardiac issues      8. Class 1 obesity with serious comorbidity and body mass index (BMI) of 33.0 to 33.9 in adult, unspecified obesity type  - Noted. Pt counseled on diet and exercise for healthy lifestyle.     9. Psoriasis  -The current plan is effective     10. Advanced directives, counseling/discussion  -I offered to discuss advanced care planning, including how to pick a person who would make decisions for you if you were unable to make them for yourself, called a health care power of , and what kind of decisions you might make such as use of life sustaining treatments such as ventilators and tube feeding when faced with a life limiting illness recorded on a living will that they will need to know. (How you want to be cared for as you near the end of your natural life)       X Patient is not interested in learning more about how to make advanced directives at this time          Provided Claudia with a 5-10 year written screening schedule and personal prevention plan. Recommendations were developed using the USPSTF age appropriate recommendations. Education, counseling, and referrals were provided as needed.  After Visit Summary printed and given to patient which includes a list of additional screenings\tests needed.    Follow up in about 1 year (around 8/14/2025) for AWV.      TANMAY Joy

## 2024-08-20 ENCOUNTER — PATIENT MESSAGE (OUTPATIENT)
Dept: ADMINISTRATIVE | Facility: HOSPITAL | Age: 64
End: 2024-08-20
Payer: MEDICARE

## 2024-11-19 ENCOUNTER — PATIENT MESSAGE (OUTPATIENT)
Dept: ADMINISTRATIVE | Facility: HOSPITAL | Age: 64
End: 2024-11-19
Payer: MEDICARE

## 2024-11-19 ENCOUNTER — PATIENT MESSAGE (OUTPATIENT)
Dept: ADMINISTRATIVE | Facility: OTHER | Age: 64
End: 2024-11-19
Payer: MEDICARE

## 2024-12-03 ENCOUNTER — HOSPITAL ENCOUNTER (OUTPATIENT)
Dept: RADIOLOGY | Facility: HOSPITAL | Age: 64
Discharge: HOME | End: 2024-12-03
Attending: FAMILY MEDICINE
Payer: MEDICARE

## 2024-12-03 DIAGNOSIS — Z12.31 ENCOUNTER FOR SCREENING MAMMOGRAM FOR BREAST CANCER: ICD-10-CM

## 2024-12-03 PROCEDURE — 77067 SCR MAMMO BI INCL CAD: CPT | Mod: TC

## 2024-12-22 DIAGNOSIS — I10 PRIMARY HYPERTENSION: ICD-10-CM

## 2024-12-22 DIAGNOSIS — E78.5 HYPERLIPIDEMIA, UNSPECIFIED HYPERLIPIDEMIA TYPE: ICD-10-CM

## 2024-12-22 NOTE — TELEPHONE ENCOUNTER
Refill Routing Note   Medication(s) are not appropriate for processing by [facility] for the following reason(s):      Required labs outdated    [facility] action(s):  Defer Care Due:  Labs due            Appointments  past 12m or future 3m with PCP    Date Provider   Last Visit   6/25/2024 Maynor Ayoub MD   Next Visit   1/29/2025 Maynor Ayoub MD   ED visits in past 90 days: 0        Note composed:3:47 PM 12/22/2024

## 2024-12-22 NOTE — TELEPHONE ENCOUNTER
Care Due:                  Date            Visit Type   Department     Provider  --------------------------------------------------------------------------------                                EP -                              PRIMARY      [facility] INTERNAL  Last Visit: 06-      CARE ([facility])   MEDICINE       Maynor Ayoub                              MYCHART                              FOLLOWUP/OF  [facility] INTERNAL  Next Visit: 01-      [facility]ICE VISIT   MEDICINE       Maynor Ayoub                                                            Last  Test          Frequency    Reason                     Performed    Due Date  --------------------------------------------------------------------------------    CMP.........  12 months..  lisinopriL-hydrochlorothi  10-   10-                             azide, simvastatin.......    Lipid Panel.  12 months..  simvastatin..............  10-   10-    [facility] Embedded Care Due Messages. Reference number: [id number].   12/22/2024 12:38:39 AM CST

## 2024-12-23 RX ORDER — SIMVASTATIN 20 MG/1
20 TABLET, FILM COATED ORAL NIGHTLY
Qty: 90 TABLET | Refills: 0 | Status: SHIPPED | OUTPATIENT
Start: 2024-12-23

## 2024-12-23 RX ORDER — LISINOPRIL AND HYDROCHLOROTHIAZIDE 10; 12.5 MG/1; MG/1
1 TABLET ORAL
Qty: 90 TABLET | Refills: 0 | Status: SHIPPED | OUTPATIENT
Start: 2024-12-23

## 2024-12-31 DIAGNOSIS — I10 HTN (HYPERTENSION): ICD-10-CM

## 2025-01-29 ENCOUNTER — OFFICE VISIT (OUTPATIENT)
Dept: INTERNAL MEDICINE | Facility: CLINIC | Age: 65
End: 2025-01-29
Payer: MEDICARE

## 2025-01-29 VITALS
TEMPERATURE: 98 F | HEIGHT: 62 IN | OXYGEN SATURATION: 98 % | DIASTOLIC BLOOD PRESSURE: 80 MMHG | BODY MASS INDEX: 32.05 KG/M2 | SYSTOLIC BLOOD PRESSURE: 118 MMHG | HEART RATE: 68 BPM | WEIGHT: 174.19 LBS | RESPIRATION RATE: 16 BRPM

## 2025-01-29 DIAGNOSIS — M25.561 CHRONIC PAIN OF RIGHT KNEE: ICD-10-CM

## 2025-01-29 DIAGNOSIS — I10 PRIMARY HYPERTENSION: ICD-10-CM

## 2025-01-29 DIAGNOSIS — G89.29 CHRONIC PAIN OF RIGHT KNEE: ICD-10-CM

## 2025-01-29 DIAGNOSIS — L98.9 DERMATOSIS: ICD-10-CM

## 2025-01-29 DIAGNOSIS — E78.5 HYPERLIPIDEMIA, UNSPECIFIED HYPERLIPIDEMIA TYPE: ICD-10-CM

## 2025-01-29 DIAGNOSIS — F41.9 ANXIETY: ICD-10-CM

## 2025-01-29 DIAGNOSIS — Z00.00 PREVENTATIVE HEALTH CARE: Primary | ICD-10-CM

## 2025-01-29 DIAGNOSIS — E66.811 CLASS 1 OBESITY WITH SERIOUS COMORBIDITY AND BODY MASS INDEX (BMI) OF 33.0 TO 33.9 IN ADULT, UNSPECIFIED OBESITY TYPE: ICD-10-CM

## 2025-01-29 PROCEDURE — 1125F AMNT PAIN NOTED PAIN PRSNT: CPT | Mod: CPTII,S$GLB,, | Performed by: FAMILY MEDICINE

## 2025-01-29 PROCEDURE — 3074F SYST BP LT 130 MM HG: CPT | Mod: CPTII,S$GLB,, | Performed by: FAMILY MEDICINE

## 2025-01-29 PROCEDURE — 99397 PER PM REEVAL EST PAT 65+ YR: CPT | Mod: S$GLB,,, | Performed by: FAMILY MEDICINE

## 2025-01-29 PROCEDURE — 99999 PR PBB SHADOW E&M-EST. PATIENT-LVL V: CPT | Mod: PBBFAC,,, | Performed by: FAMILY MEDICINE

## 2025-01-29 PROCEDURE — 99214 OFFICE O/P EST MOD 30 MIN: CPT | Mod: 25,S$GLB,, | Performed by: FAMILY MEDICINE

## 2025-01-29 PROCEDURE — 1160F RVW MEDS BY RX/DR IN RCRD: CPT | Mod: CPTII,S$GLB,, | Performed by: FAMILY MEDICINE

## 2025-01-29 PROCEDURE — 3288F FALL RISK ASSESSMENT DOCD: CPT | Mod: CPTII,S$GLB,, | Performed by: FAMILY MEDICINE

## 2025-01-29 PROCEDURE — 1100F PTFALLS ASSESS-DOCD GE2>/YR: CPT | Mod: CPTII,S$GLB,, | Performed by: FAMILY MEDICINE

## 2025-01-29 PROCEDURE — 1159F MED LIST DOCD IN RCRD: CPT | Mod: CPTII,S$GLB,, | Performed by: FAMILY MEDICINE

## 2025-01-29 PROCEDURE — 3079F DIAST BP 80-89 MM HG: CPT | Mod: CPTII,S$GLB,, | Performed by: FAMILY MEDICINE

## 2025-01-29 PROCEDURE — 3008F BODY MASS INDEX DOCD: CPT | Mod: CPTII,S$GLB,, | Performed by: FAMILY MEDICINE

## 2025-01-29 RX ORDER — SIMVASTATIN 20 MG/1
20 TABLET, FILM COATED ORAL NIGHTLY
Qty: 90 TABLET | Refills: 3 | Status: SHIPPED | OUTPATIENT
Start: 2025-01-29

## 2025-01-29 RX ORDER — PSYLLIUM HUSK 0.4 G
CAPSULE ORAL DAILY
COMMUNITY
Start: 2024-08-09

## 2025-01-29 RX ORDER — LISINOPRIL AND HYDROCHLOROTHIAZIDE 10; 12.5 MG/1; MG/1
1 TABLET ORAL DAILY
Qty: 90 TABLET | Refills: 3 | Status: SHIPPED | OUTPATIENT
Start: 2025-01-29

## 2025-01-29 NOTE — PROGRESS NOTES
Subjective     Patient ID: Claudia Álvarez is a 64 y.o. female.    Chief Complaint: Annual Exam (Had fall in may and right knee seems to be getting worse, pain today at 8,  says has had a couple of falls in last year. , /2.  still having shaking in hand, mostly right.  Left know started.    /3. Also wants to see derm for skin check- black mole seen yesterday. .)    HPI 64-year-old white female presents to clinic today for annual physical exam.  Hypertension remains well controlled on lisinopril-hydrochlorothiazide 10/12.5 mg daily.  Hyperlipidemia remains stable on simvastatin 20 mg daily.  Anxiety remains stable on Zoloft 200 mg daily.  She reports a past surgical history of cholecystectomy, bilateral tubal ligation, and right knee arthroscopic surgery. She has a family history of early coronary artery disease in her father who had a heart attack at the age of 65. Her mother had hypertension and diabetes.  She is up-to-date with all screening exams and vaccinations.    This note documents the patient's acute complaint of knee pain and skin lesion addressed within their primary visit for annual preventative exam.    HPI:  The patient reports continued right knee pain that has been ongoing since a fall.  She suffered 2 falls 1 in April and 1 mother's day weekend.  She was evaluated in June secondary to these falls at which time x-ray revealed mild patellofemoral instability.  X-rays were performed and showed mild degenerative joint disease.  She has been using ice, heat, Tylenol, and ibuprofen but continues to note mild pain to the knee.  Secondarily, her  and daughter noted a skin lesion to her back a few days ago.  She denies any pain or itching to the lesion.  ROS:  Review of systems is positive for right knee pain and skin lesion.  Physical exam:  Physical exam is positive for tenderness to palpation to the right medial joint line and a 2 mm x 2 mm black skin lesion to the mid back.  Assessment:    1.  Chronic pain of right knee  2. Dermatosis   Plan:   1. Refer to orthopedics for further evaluation and treatment of chronic right knee pain.    2. Refer to dermatology for further evaluation of skin lesion.      Review of Systems   Constitutional:  Negative for activity change, appetite change, chills, fatigue, fever and unexpected weight change.   HENT:  Negative for nasal congestion, ear pain, hearing loss, postnasal drip, rhinorrhea, sinus pressure/congestion, sore throat, tinnitus and trouble swallowing.    Eyes:  Negative for discharge, redness, itching and visual disturbance.   Respiratory:  Negative for cough, chest tightness, shortness of breath and wheezing.    Cardiovascular:  Negative for chest pain and palpitations.   Gastrointestinal:  Negative for abdominal pain, blood in stool, constipation, diarrhea, nausea and vomiting.   Endocrine: Negative for polydipsia and polyuria.   Genitourinary:  Negative for decreased urine volume, difficulty urinating, dysuria, frequency, hematuria, menstrual problem and urgency.   Musculoskeletal:  Positive for arthralgias (right knee) and joint swelling. Negative for back pain, myalgias, neck pain and neck stiffness.   Integumentary:  Negative for rash.   Neurological:  Negative for dizziness, weakness, light-headedness and headaches.   Psychiatric/Behavioral: Negative.  Negative for confusion and dysphoric mood.           Objective     Physical Exam  Vitals and nursing note reviewed.   Constitutional:       General: She is not in acute distress.     Appearance: She is well-developed. She is not diaphoretic.   HENT:      Head: Normocephalic and atraumatic.      Right Ear: External ear normal.      Left Ear: External ear normal.      Nose: Nose normal.      Mouth/Throat:      Pharynx: No oropharyngeal exudate.   Eyes:      General: No scleral icterus.        Right eye: No discharge.         Left eye: No discharge.      Conjunctiva/sclera: Conjunctivae normal.      Pupils:  Pupils are equal, round, and reactive to light.   Neck:      Thyroid: No thyromegaly.      Vascular: No JVD.      Trachea: No tracheal deviation.   Cardiovascular:      Rate and Rhythm: Normal rate and regular rhythm.      Heart sounds: Normal heart sounds. No murmur heard.     No friction rub. No gallop.   Pulmonary:      Effort: Pulmonary effort is normal. No respiratory distress.      Breath sounds: Normal breath sounds. No stridor. No wheezing or rales.   Abdominal:      General: Bowel sounds are normal. There is no distension.      Palpations: Abdomen is soft. There is no mass.      Tenderness: There is no abdominal tenderness. There is no guarding or rebound.   Musculoskeletal:         General: Normal range of motion.      Cervical back: Normal range of motion and neck supple.      Right knee: Tenderness present over the medial joint line.   Lymphadenopathy:      Cervical: No cervical adenopathy.   Skin:     General: Skin is warm and dry.      Coloration: Skin is not pale.      Findings: No erythema or rash.          Neurological:      Mental Status: She is alert and oriented to person, place, and time.   Psychiatric:         Behavior: Behavior normal.         Thought Content: Thought content normal.         Judgment: Judgment normal.            Assessment and Plan     1. Preventative health care  -     CBC Auto Differential; Future; Expected date: 01/29/2025  -     Comprehensive Metabolic Panel; Future; Expected date: 01/29/2025  -     Lipid Panel; Future; Expected date: 01/29/2025  -     T4, Free; Future; Expected date: 01/29/2025  -     TSH; Future; Expected date: 01/29/2025    2. Primary hypertension  -     CBC Auto Differential; Future; Expected date: 01/29/2025  -     Comprehensive Metabolic Panel; Future; Expected date: 01/29/2025  -     Lipid Panel; Future; Expected date: 01/29/2025  -     T4, Free; Future; Expected date: 01/29/2025  -     TSH; Future; Expected date: 01/29/2025  -      lisinopriL-hydrochlorothiazide (PRINZIDE,ZESTORETIC) 10-12.5 mg per tablet; Take 1 tablet by mouth once daily.  Dispense: 90 tablet; Refill: 3    3. Hyperlipidemia, unspecified hyperlipidemia type  -     CBC Auto Differential; Future; Expected date: 01/29/2025  -     Comprehensive Metabolic Panel; Future; Expected date: 01/29/2025  -     Lipid Panel; Future; Expected date: 01/29/2025  -     T4, Free; Future; Expected date: 01/29/2025  -     TSH; Future; Expected date: 01/29/2025  -     simvastatin (ZOCOR) 20 MG tablet; Take 1 tablet (20 mg total) by mouth every evening.  Dispense: 90 tablet; Refill: 3    4. Anxiety  -     CBC Auto Differential; Future; Expected date: 01/29/2025  -     Comprehensive Metabolic Panel; Future; Expected date: 01/29/2025  -     Lipid Panel; Future; Expected date: 01/29/2025  -     T4, Free; Future; Expected date: 01/29/2025  -     TSH; Future; Expected date: 01/29/2025    5. Class 1 obesity with serious comorbidity and body mass index (BMI) of 33.0 to 33.9 in adult, unspecified obesity type  -     CBC Auto Differential; Future; Expected date: 01/29/2025  -     Comprehensive Metabolic Panel; Future; Expected date: 01/29/2025  -     Lipid Panel; Future; Expected date: 01/29/2025  -     T4, Free; Future; Expected date: 01/29/2025  -     TSH; Future; Expected date: 01/29/2025    6. Chronic pain of right knee  -     Ambulatory referral/consult to Orthopedics; Future; Expected date: 02/05/2025  -     CBC Auto Differential; Future; Expected date: 01/29/2025  -     Comprehensive Metabolic Panel; Future; Expected date: 01/29/2025  -     Lipid Panel; Future; Expected date: 01/29/2025  -     T4, Free; Future; Expected date: 01/29/2025  -     TSH; Future; Expected date: 01/29/2025    7. Dermatosis  -     Ambulatory referral/consult to Dermatology; Future; Expected date: 02/05/2025  -     CBC Auto Differential; Future; Expected date: 01/29/2025  -     Comprehensive Metabolic Panel; Future; Expected  date: 01/29/2025  -     Lipid Panel; Future; Expected date: 01/29/2025  -     T4, Free; Future; Expected date: 01/29/2025  -     TSH; Future; Expected date: 01/29/2025        1. CBC, CMP, TSH, free T4, and fasting lipids.    2. Continue lisinopril-hydrochlorothiazide 10/12.5 mg daily.  Hypertension is well controlled.    3. Continue simvastatin 20 mg nightly.  Hyperlipidemia is stable.  4. Continue Zoloft 200 mg daily.  Anxiety is stable.    5. Continue diet and exercise.  Patient has lost 5 lb since last visit.    6. Return to clinic as needed or in 1 year for annual physical exam.               Follow up in about 1 year (around 1/29/2026), or if symptoms worsen or fail to improve, for Annual exam.

## 2025-01-30 ENCOUNTER — LAB VISIT (OUTPATIENT)
Dept: LAB | Facility: HOSPITAL | Age: 65
End: 2025-01-30
Attending: FAMILY MEDICINE
Payer: MEDICARE

## 2025-01-30 DIAGNOSIS — L98.9 DERMATOSIS: ICD-10-CM

## 2025-01-30 DIAGNOSIS — F41.9 ANXIETY: ICD-10-CM

## 2025-01-30 DIAGNOSIS — G89.29 CHRONIC PAIN OF RIGHT KNEE: ICD-10-CM

## 2025-01-30 DIAGNOSIS — E66.811 CLASS 1 OBESITY WITH SERIOUS COMORBIDITY AND BODY MASS INDEX (BMI) OF 33.0 TO 33.9 IN ADULT, UNSPECIFIED OBESITY TYPE: ICD-10-CM

## 2025-01-30 DIAGNOSIS — E78.5 HYPERLIPIDEMIA, UNSPECIFIED HYPERLIPIDEMIA TYPE: ICD-10-CM

## 2025-01-30 DIAGNOSIS — Z00.00 ENCOUNTER FOR MEDICARE ANNUAL WELLNESS EXAM: ICD-10-CM

## 2025-01-30 DIAGNOSIS — I10 PRIMARY HYPERTENSION: ICD-10-CM

## 2025-01-30 DIAGNOSIS — M25.561 CHRONIC PAIN OF RIGHT KNEE: ICD-10-CM

## 2025-01-30 DIAGNOSIS — Z00.00 PREVENTATIVE HEALTH CARE: ICD-10-CM

## 2025-01-30 LAB
ALBUMIN SERPL BCP-MCNC: 4.2 G/DL (ref 3.5–5.2)
ALP SERPL-CCNC: 57 U/L (ref 40–150)
ALT SERPL W/O P-5'-P-CCNC: 18 U/L (ref 10–44)
ANION GAP SERPL CALC-SCNC: 9 MMOL/L (ref 8–16)
AST SERPL-CCNC: 21 U/L (ref 10–40)
BASOPHILS # BLD AUTO: 0.03 K/UL (ref 0–0.2)
BASOPHILS NFR BLD: 0.6 % (ref 0–1.9)
BILIRUB SERPL-MCNC: 0.4 MG/DL (ref 0.1–1)
BUN SERPL-MCNC: 17 MG/DL (ref 8–23)
CALCIUM SERPL-MCNC: 9.1 MG/DL (ref 8.7–10.5)
CHLORIDE SERPL-SCNC: 105 MMOL/L (ref 95–110)
CHOLEST SERPL-MCNC: 159 MG/DL (ref 120–199)
CHOLEST/HDLC SERPL: 3.2 {RATIO} (ref 2–5)
CO2 SERPL-SCNC: 25 MMOL/L (ref 23–29)
CREAT SERPL-MCNC: 0.8 MG/DL (ref 0.5–1.4)
DIFFERENTIAL METHOD BLD: ABNORMAL
EOSINOPHIL # BLD AUTO: 0.1 K/UL (ref 0–0.5)
EOSINOPHIL NFR BLD: 2.8 % (ref 0–8)
ERYTHROCYTE [DISTWIDTH] IN BLOOD BY AUTOMATED COUNT: 13.3 % (ref 11.5–14.5)
EST. GFR  (NO RACE VARIABLE): >60 ML/MIN/1.73 M^2
GLUCOSE SERPL-MCNC: 92 MG/DL (ref 70–110)
HCT VFR BLD AUTO: 36.8 % (ref 37–48.5)
HDLC SERPL-MCNC: 50 MG/DL (ref 40–75)
HDLC SERPL: 31.4 % (ref 20–50)
HGB BLD-MCNC: 12.1 G/DL (ref 12–16)
IMM GRANULOCYTES # BLD AUTO: 0.01 K/UL (ref 0–0.04)
IMM GRANULOCYTES NFR BLD AUTO: 0.2 % (ref 0–0.5)
LDLC SERPL CALC-MCNC: 97 MG/DL (ref 63–159)
LYMPHOCYTES # BLD AUTO: 1.9 K/UL (ref 1–4.8)
LYMPHOCYTES NFR BLD: 37.3 % (ref 18–48)
MCH RBC QN AUTO: 28.5 PG (ref 27–31)
MCHC RBC AUTO-ENTMCNC: 32.9 G/DL (ref 32–36)
MCV RBC AUTO: 87 FL (ref 82–98)
MONOCYTES # BLD AUTO: 0.5 K/UL (ref 0.3–1)
MONOCYTES NFR BLD: 9 % (ref 4–15)
NEUTROPHILS # BLD AUTO: 2.5 K/UL (ref 1.8–7.7)
NEUTROPHILS NFR BLD: 50.1 % (ref 38–73)
NONHDLC SERPL-MCNC: 109 MG/DL
NRBC BLD-RTO: 0 /100 WBC
PLATELET # BLD AUTO: 168 K/UL (ref 150–450)
PMV BLD AUTO: 12.1 FL (ref 9.2–12.9)
POTASSIUM SERPL-SCNC: 4 MMOL/L (ref 3.5–5.1)
PROT SERPL-MCNC: 7.4 G/DL (ref 6–8.4)
RBC # BLD AUTO: 4.24 M/UL (ref 4–5.4)
SODIUM SERPL-SCNC: 139 MMOL/L (ref 136–145)
T4 FREE SERPL-MCNC: 0.85 NG/DL (ref 0.71–1.51)
TRIGL SERPL-MCNC: 60 MG/DL (ref 30–150)
TSH SERPL DL<=0.005 MIU/L-ACNC: 1.1 UIU/ML (ref 0.4–4)
WBC # BLD AUTO: 5.02 K/UL (ref 3.9–12.7)

## 2025-01-30 PROCEDURE — 80053 COMPREHEN METABOLIC PANEL: CPT | Performed by: FAMILY MEDICINE

## 2025-01-30 PROCEDURE — 84443 ASSAY THYROID STIM HORMONE: CPT | Performed by: FAMILY MEDICINE

## 2025-01-30 PROCEDURE — 36415 COLL VENOUS BLD VENIPUNCTURE: CPT | Performed by: FAMILY MEDICINE

## 2025-01-30 PROCEDURE — 80061 LIPID PANEL: CPT | Performed by: FAMILY MEDICINE

## 2025-01-30 PROCEDURE — 84439 ASSAY OF FREE THYROXINE: CPT | Performed by: FAMILY MEDICINE

## 2025-01-30 PROCEDURE — 85025 COMPLETE CBC W/AUTO DIFF WBC: CPT | Performed by: FAMILY MEDICINE

## 2025-02-03 ENCOUNTER — HOSPITAL ENCOUNTER (OUTPATIENT)
Dept: RADIOLOGY | Facility: HOSPITAL | Age: 65
Discharge: HOME | End: 2025-02-03
Payer: MEDICARE

## 2025-02-03 ENCOUNTER — OFFICE VISIT (OUTPATIENT)
Dept: ORTHOPEDICS | Facility: CLINIC | Age: 65
End: 2025-02-03
Payer: MEDICARE

## 2025-02-03 VITALS — WEIGHT: 175.38 LBS | BODY MASS INDEX: 32.08 KG/M2

## 2025-02-03 DIAGNOSIS — G89.29 CHRONIC PAIN OF RIGHT KNEE: ICD-10-CM

## 2025-02-03 DIAGNOSIS — M25.561 CHRONIC PAIN OF RIGHT KNEE: ICD-10-CM

## 2025-02-03 DIAGNOSIS — M17.11 PRIMARY OSTEOARTHRITIS OF RIGHT KNEE: Primary | ICD-10-CM

## 2025-02-03 DIAGNOSIS — M76.891 HIP FLEXOR TENDONITIS, RIGHT: ICD-10-CM

## 2025-02-03 PROCEDURE — 73564 X-RAY EXAM KNEE 4 OR MORE: CPT | Mod: 26,RT,, | Performed by: RADIOLOGY

## 2025-02-03 PROCEDURE — 73562 X-RAY EXAM OF KNEE 3: CPT | Mod: 26,59,LT, | Performed by: RADIOLOGY

## 2025-02-03 PROCEDURE — 73502 X-RAY EXAM HIP UNI 2-3 VIEWS: CPT | Mod: TC,RT

## 2025-02-03 PROCEDURE — 4010F ACE/ARB THERAPY RXD/TAKEN: CPT | Mod: CPTII,S$GLB,,

## 2025-02-03 PROCEDURE — 1100F PTFALLS ASSESS-DOCD GE2>/YR: CPT | Mod: CPTII,S$GLB,,

## 2025-02-03 PROCEDURE — 1125F AMNT PAIN NOTED PAIN PRSNT: CPT | Mod: CPTII,S$GLB,,

## 2025-02-03 PROCEDURE — 99999 PR PBB SHADOW E&M-EST. PATIENT-LVL III: CPT | Mod: PBBFAC,,,

## 2025-02-03 PROCEDURE — 20610 DRAIN/INJ JOINT/BURSA W/O US: CPT | Mod: RT,S$GLB,,

## 2025-02-03 PROCEDURE — 73502 X-RAY EXAM HIP UNI 2-3 VIEWS: CPT | Mod: 26,RT,, | Performed by: RADIOLOGY

## 2025-02-03 PROCEDURE — 99204 OFFICE O/P NEW MOD 45 MIN: CPT | Mod: 25,S$GLB,,

## 2025-02-03 PROCEDURE — 1159F MED LIST DOCD IN RCRD: CPT | Mod: CPTII,S$GLB,,

## 2025-02-03 PROCEDURE — 3288F FALL RISK ASSESSMENT DOCD: CPT | Mod: CPTII,S$GLB,,

## 2025-02-03 PROCEDURE — 73564 X-RAY EXAM KNEE 4 OR MORE: CPT | Mod: TC,RT

## 2025-02-03 PROCEDURE — 1160F RVW MEDS BY RX/DR IN RCRD: CPT | Mod: CPTII,S$GLB,,

## 2025-02-03 PROCEDURE — 3008F BODY MASS INDEX DOCD: CPT | Mod: CPTII,S$GLB,,

## 2025-02-03 RX ORDER — TRIAMCINOLONE ACETONIDE 40 MG/ML
40 INJECTION, SUSPENSION INTRA-ARTICULAR; INTRAMUSCULAR ONCE
Status: COMPLETED | OUTPATIENT
Start: 2025-02-03 | End: 2025-02-03

## 2025-02-03 RX ADMIN — TRIAMCINOLONE ACETONIDE 40 MG: 40 INJECTION, SUSPENSION INTRA-ARTICULAR; INTRAMUSCULAR at 07:02

## 2025-02-04 NOTE — PROGRESS NOTES
SUBJECTIVE:     Chief Complaint & History of Present Illness:  History of Present Illness    CHIEF COMPLAINT:  - Right knee pain with associated hip discomfort.    HPI:  Ms. Álvarez presents with right knee pain that started after a fall in May of last year. She reports pain localized to the inside of the knee, from top to bottom, with associated swelling in the lower part of the leg at times. Pain has significantly worsened in the last couple of weeks, rating it as 9.5 to 10 out of 10. She describes it as a shooting pain that progresses throughout the day, starting as soreness in the morning. Pain is exacerbated by weight-bearing activities and stretching the leg.    Ms. Álvarez has difficulty sleeping due to pain, struggling to find a comfortable position. She reports being restless throughout the night and experiences increased pain when stretching her leg out. She also mentions right hip pain, primarily occurring at night when trying to get positioned comfortably. Hip pain is described as static and not constant.    Within the past month, she has been experiencing numbness and tingling along the outside of her right leg, denying any recent injury causing this symptom.    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 She has trouble with weight-bearing activities due to pain.    For pain management, she occasionally uses Voltaren gel, Motrin, or Aleve, which provide temporary relief. Ms. Álvarez saw her PCP last Wednesday and reports experiencing severe pain by Tuesday night due to a full day of activity.    She had a previous arthroscopy on her left knee in 2005, which she reports as being highly beneficial. She has not received any injections or other treatments for her right knee beyond topical applications.    Ms. Álvarez denies any lower back pain, weakness in the right leg compared to the left, or any giving out of the right  leg. She denies any medical history of significance related to her current symptoms.    PREVIOUS TREATMENTS:  - Voltaren gel topically on the right knee when pain is severe, providing temporary relief.  - Motrin occasionally when pain is severe, providing temporary relief.  - Aleve occasionally when pain is severe, providing temporary relief.    IMAGING:  - X-rays of the right knee (multiple views including flexion view): Joint space narrowing between femur and tibia, more pronounced on the inside part of the knee, Osteophytes present, Mild to moderate arthritis  - X-rays of the left knee: Similar findings to the right knee, but asymptomatic  - X-rays of the hip: Joint space looks good, Possible mild narrowing on the medial aspect, but nothing super far advanced, Frog leg view shows good joint space    MEDICATIONS:  - Voltaren gel: applied topically to right knee when pain is severe, providing temporary relief  - Motrin: when pain is severe, providing temporary relief  - Aleve: when pain is severe, providing temporary relief    SURGICAL HISTORY:  - Left knee arthroscopy: 2005, likely a meniscectomy    WORK STATUS:  - Retired  - 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 [facility]/arthroscopy    TUBAL LIGATION         Family History   Problem Relation Name Age of Onset    Hypertension Mother JENNIFER SOTO     Diabetes Mother JENNIFER SOTO     Arthritis Mother JENNIFER SOTO     Heart disease Mother JENNIFER SOTO     Hypertension Father JESUS SOTO     Heart disease Father JESUS SOTO 65        MI    Kidney disease Father JESUS SOTO 60    Melanoma Daughter      Depression Sister Alisa Boland     [facility]  illness Sister Alisa Boland     Diabetes Brother Justin Judd        Review of patient's allergies indicates:  No Known Allergies        Current Outpatient Medications:     calcium carbonate (CALCIUM 600) 600 mg calcium (1,500 mg) Tab, once daily. On 1200mg daily., Disp: , Rfl:     COLLAGEN-BIOTIN-ASCORBIC ACID ORAL, , Disp: , Rfl:     cyanocobalamin, vitamin B-12, (VITAMIN B-12 ORAL), Take by mouth Daily. She isn't sure of mg., Disp: , Rfl:     ergocalciferol, vitamin D2, (VITAMIN D2 ORAL), once daily. Doing 2000 units daily., Disp: , Rfl:     lisinopriL-hydrochlorothiazide (PRINZIDE,ZESTORETIC) 10-12.5 mg per tablet, Take 1 tablet by mouth once daily., Disp: 90 tablet, Rfl: 3    multivitamin (THERAGRAN) per tablet, Take 1 tablet by mouth once daily., Disp: , Rfl:     sertraline (ZOLOFT) 100 MG tablet, TAKE 2 TABLETS BY MOUTH ONCE DAILY, Disp: 180 tablet, Rfl: 3    simvastatin (ZOCOR) 20 MG tablet, Take 1 tablet (20 mg total) by mouth every evening., Disp: 90 tablet, Rfl: 3    vitamin B complex (SUPER B COMPLEX-B-12 ORAL), , Disp: , Rfl:       Review of Systems:  ROS:  The updated medical history is in the chart and has been reviewed. A review of systems is updated and there is no reported vision changes, ear/nose/mouth/throat complaints, chest pain, shortness of breath, abdominal pain, urological complaints, fevers or chills, psychiatric complaints. Musculoskeletal and neurologcial symptoms are as documented. All other systems are negative.      OBJECTIVE:     PHYSICAL EXAM:  Wt 79.5 kg (175 lb 6 oz)   BMI 32.08 kg/m²   General: Pleasant, cooperative, NAD.  HEENT: NCAT, sclera nonicteric.  Lungs: Respirations are equal and unlabored.   Abdomen: Soft and non-tender.  CV: 2+ bilateral upper and lower extremity pulses.  Neuro: Sensation intact to light touch.  Skin: Intact throughout LE with no rashes, erythema, or lesions.  Extremities: No LE edema, NVI lower extremities. antalgic gait.    right Knee Exam:  Knee  Range of Motion:  0-120   Effusion:  Mild  Condition of skin: intact  Location of tenderness: Medial joint line   Strength: 5/5 quadriceps strength, 5/5 gastroc-soleus strength, 5/5 hamstring strength, and 5/5 tibialis anterior strength  Stability: stable to testing  Knee Alignment: normal    right Hip Exam:  TTP:  Mild anterior associated with hip flexor  90 degrees flexion  10 degrees extension   10 degrees internal rotation  30 degrees external rotation  30 degrees abduction  20 degrees adduction   0 flexion contracture   negative TON   negative FADIR  negative [place]    RADIOGRAPHS:  X-rays of the right knee taken today personally reviewed. Imaging reveals mild-to-moderate medial tibiofemoral joint space narrowing with osteophytes present.    X-rays of the right hip taken today personally reviewed. Imaging reveals well-maintained joint space with no acute fractures or dislocations.      ASSESSMENT:       ICD-10-CM ICD-9-CM   1. Primary osteoarthritis of right knee  M17.11 715.16   2. Hip flexor tendonitis, right  M76.891 727.09       PLAN:     Assessment & Plan    MEDICATIONS:  - Consider trying meloxicam if needed for pain.    PROCEDURES:  - Right knee steroid injection administered today.  - Potential future gel injection discussed as alternative treatment if steroid injection inadequate.  - Gel injection requires prior authorization, typically taking 2 weeks for approval.    FOLLOW UP:  - Contact office through portal or phone if additional care is required.    PATIENT INSTRUCTIONS:  - Perform quad strengthening exercises.  - Focus on hip flexor stretching and strengthening.          Knee Injection Procedure Note  Diagnosis: right knee degenerative arthritis  Indications: right knee pain  Procedure Details: Verbal consent was obtained for the procedure. The injection site was identified and the skin was prepared with alcohol. The right knee was injected from an anterolateral approach with 1 ml of  Kenalog and 4 ml Lidocaine under sterile technique using a 22 gauge needle. The needle was removed and the area cleansed and dressed.  Complications:  Patient tolerated the procedure well.    she was advised to rest the knee today, using ice and elevation as needed for comfort and swelling.Immediate relief of the knee pain may be short lived and secondary to the lidocaine. she may have an increase in discomfort tonight followed by steady improvement over the next several days. It may take 1-2 weeks following the injection to get the full benefit of the medication.     TAMERA Christensen Jr.C